# Patient Record
Sex: MALE | Race: WHITE | NOT HISPANIC OR LATINO | Employment: FULL TIME | URBAN - METROPOLITAN AREA
[De-identification: names, ages, dates, MRNs, and addresses within clinical notes are randomized per-mention and may not be internally consistent; named-entity substitution may affect disease eponyms.]

---

## 2017-06-23 ENCOUNTER — TELEPHONE (OUTPATIENT)
Dept: MEDICAL GROUP | Facility: MEDICAL CENTER | Age: 42
End: 2017-06-23

## 2017-06-23 NOTE — TELEPHONE ENCOUNTER
NEW PATIENT VISIT PRE-VISIT PLANNING    1.  [employer] Patient is checked in Patient Demographics? NO    2.  Immunizations were updated in [facility] using WebIZ?: Yes       •  Web Iz Recommendations: HEPATITIS A  TDAP    3.  Is this appointment scheduled as a Hospital Follow-Up? No    4.  Patient is due for the following Health Maintenance Topics:   Health Maintenance Due   Topic Date Due   • IMM DTaP/Tdap/Td Vaccine (1 - Tdap) 12/09/2005           5.  Reviewed/Updated the following with patient:       •   Preferred Pharmacy? NO       •   Preferred Lab? NO       •   Medications? NO       •   Social History? NO       •   Family History? NO    6.  Updated Care Team?       •   DME Company (gait device, O2, CPAP, etc.) N\A       •   Other Specialists (eye doctor, derm, GYN, cardiology, endo, etc): N\A    7.  Patient was informed to arrive 15 min prior to their scheduled appointment and bring in their medication bottles.    Left vm for patient reminding him of appt.

## 2017-06-26 ENCOUNTER — HOSPITAL ENCOUNTER (OUTPATIENT)
Facility: MEDICAL CENTER | Age: 42
End: 2017-06-26
Attending: INTERNAL MEDICINE
Payer: COMMERCIAL

## 2017-06-26 ENCOUNTER — OFFICE VISIT (OUTPATIENT)
Dept: MEDICAL GROUP | Facility: MEDICAL CENTER | Age: 42
End: 2017-06-26
Payer: COMMERCIAL

## 2017-06-26 VITALS
TEMPERATURE: 97.9 F | SYSTOLIC BLOOD PRESSURE: 124 MMHG | HEIGHT: 72 IN | OXYGEN SATURATION: 96 % | HEART RATE: 57 BPM | BODY MASS INDEX: 36.08 KG/M2 | WEIGHT: 266.4 LBS | DIASTOLIC BLOOD PRESSURE: 72 MMHG

## 2017-06-26 DIAGNOSIS — Z01.89 PATIENT REQUESTED DIAGNOSTIC TESTING: ICD-10-CM

## 2017-06-26 DIAGNOSIS — E66.9 OBESITY (BMI 30-39.9): ICD-10-CM

## 2017-06-26 DIAGNOSIS — Z00.00 HEALTH CARE MAINTENANCE: ICD-10-CM

## 2017-06-26 DIAGNOSIS — Z11.4 SCREENING FOR HIV (HUMAN IMMUNODEFICIENCY VIRUS): ICD-10-CM

## 2017-06-26 DIAGNOSIS — Z76.89 ENCOUNTER TO ESTABLISH CARE: ICD-10-CM

## 2017-06-26 DIAGNOSIS — R73.01 IFG (IMPAIRED FASTING GLUCOSE): ICD-10-CM

## 2017-06-26 DIAGNOSIS — M10.9 GOUT OF BIG TOE: ICD-10-CM

## 2017-06-26 PROCEDURE — 87491 CHLMYD TRACH DNA AMP PROBE: CPT

## 2017-06-26 PROCEDURE — 87591 N.GONORRHOEAE DNA AMP PROB: CPT

## 2017-06-26 PROCEDURE — 99214 OFFICE O/P EST MOD 30 MIN: CPT | Performed by: INTERNAL MEDICINE

## 2017-06-26 ASSESSMENT — PATIENT HEALTH QUESTIONNAIRE - PHQ9: CLINICAL INTERPRETATION OF PHQ2 SCORE: 0

## 2017-06-26 NOTE — MR AVS SNAPSHOT
"        Ross Ryder   2017 9:20 AM   Office Visit   MRN: [id number]    Department:  [place] [address]   Dept Phone:  [phone]    Description:  Male : 1975   Provider:  Lukasz Borrego M.D.           Reason for Visit     Establish Care           Allergies as of 2017     Allergen Noted Reactions    Penicillin G 2016         You were diagnosed with     IFG (impaired fasting glucose)   [144777]       Obesity (BMI 30-39.9)   [360591]       Gout of big toe   [809576]       Encounter to establish care   [211694]       Health care maintenance   [135142]       Screening for HIV (human immunodeficiency virus)   [259949]       Patient requested diagnostic testing   [637053]         Vital Signs     Blood Pressure Pulse Temperature Height Weight Body Mass Index    124/72 mmHg 57 36.6 °C (97.9 °F) 1.829 m (6' 0.01\") 120.838 kg (266 lb 6.4 oz) 36.12 kg/m2    Oxygen Saturation Smoking Status                96% Never Smoker           Basic Information     Date Of Birth Sex Race Ethnicity Preferred Language    1975 Male White Non- English      Your appointments     2017  9:20 AM   Access New Patient with Lukasz Borrego M.D.   [facility]    [id number]  [place] [id number] [phone]           Please bring Photo ID, Insurance Cards, All Medication Bottles and copies of any legal documents (such as Living Will, Power of ) If speaking a language besides English please bring an adult . Please arrive 30 minutes prior for check in and registration. You will be receiving a confirmation call a few days before your appointment from our automated call confirmation system.            2018  9:00 AM   Established Patient with Lukasz Borrego M.D.   [facility])    [address]  [address]  [place] [address] [phone]           You " will be receiving a confirmation call a few days before your appointment from our automated call confirmation system.              Problem List              ICD-10-CM Priority Class Noted - Resolved    Obesity (BMI 30-39.9) E66.9   6/26/2017 - Present    Health care maintenance Z00.00   6/26/2017 - Present      Health Maintenance        Date Due Completion Dates    IMM DTaP/Tdap/Td Vaccine (1 - Tdap) 12/9/2005 12/8/2005            Current Immunizations     Influenza Vaccine Quad Inj (Pf) 11/22/2016    Influenza Vaccine Quad Inj (Preserved) 12/22/2015    TD Vaccine 12/8/2005      Below and/or attached are the medications your provider expects you to take. Review all of your home medications and newly ordered medications with your provider and/or pharmacist. Follow medication instructions as directed by your provider and/or pharmacist. Please keep your medication list with you and share with your provider. Update the information when medications are discontinued, doses are changed, or new medications (including over-the-counter products) are added; and carry medication information at all times in the event of emergency situations     Allergies:  PENICILLIN G - (reactions not documented)               Medications  Valid as of: June 26, 2017 -  9:13 AM    Generic Name Brand Name Tablet Size Instructions for use    Ibuprofen   Take  by mouth.        Sulfamethoxazole-Trimethoprim (Tab) BACTRIM -160 MG Take 1 Tab by mouth 2 times a day.        Triamcinolone Acetonide (Cream) KENALOG 0.1 % Apply 1 Film to affected area(s) 2 times a day.        Triamcinolone Acetonide (Cream) KENALOG 0.1 % Apply to rash two times daily until resolved.        .                 Medicines prescribed today were sent to:     [id number] - [place] - [address]    [address] [place] [address]    Phone: [phone] Fax: [phone]    Open 24 Hours?: No      Medication refill instructions:       If your prescription  bottle indicates you have medication refills left, it is not necessary to call your provider’s office. Please contact your pharmacy and they will refill your medication.    If your prescription bottle indicates you do not have any refills left, you may request refills at any time through one of the following ways: The online [employer] system (except Urgent Care), by calling your provider’s office, or by asking your pharmacy to contact your provider’s office with a refill request. Medication refills are processed only during regular business hours and may not be available until the next business day. Your provider may request additional information or to have a follow-up visit with you prior to refilling your medication.   *Please Note: Medication refills are assigned a new Rx number when refilled electronically. Your pharmacy may indicate that no refills were authorized even though a new prescription for the same medication is available at the pharmacy. Please request the medicine by name with the pharmacy before contacting your provider for a refill.        Your To Do List     Future Labs/Procedures Complete By Expires    CBC WITH DIFFERENTIAL  As directed 6/27/2018    CHLAMYDIA/GC PCR URINE OR SWAB  As directed 6/26/2018    COMP METABOLIC PANEL  As directed 6/27/2018    HEMOGLOBIN A1C  As directed 6/27/2018    HIV ANTIBODIES  As directed 6/26/2018    LIPID PROFILE  As directed 6/27/2018    TSH  As directed 6/27/2018    URIC ACID  As directed 6/27/2018    VITAMIN D,25 HYDROXY  As directed 6/27/2018         [employer] Access Code: Activation code not generated  Current [employer] Status: Active

## 2017-06-26 NOTE — PROGRESS NOTES
CHIEF COMPLAINT  Chief Complaint   Patient presents with   • [facility]   Gout    HPI  Patient is a 42 y.o. male patient who presents today for the following     IFG  The patient had elevated FBG.  No polydipsia, polyphagia, polyuria.  No abdominal pain, weight loss, fatigue.  Diet: Regular  Exercise: Decreased in the last 3-6 months  FH of DM: Father, P-GF    OBESITY, Body mass index is 36.12 kg/(m^2).  Onset: since the age of mid 30'  Diet and exercise as above  No temperature intolerance. No change in hair/skin quality, BMs.   No HTN, buffalo hump, purple striae, flushing.  FH of obesity: mother, M-aunt    Gout, R great toe  First episode, new problem.  He had transient episode of the right first MTP pain and redness for a few days 1 month ago.   He noticed that it was after big supper.  He did not have fever, chills, trauma.  Resolved spontaneously.      Reviewed PMH, PSH, FH, SH, ALL, HCM/IMM  Reviewed MEDS    Patient Active Problem List    Diagnosis Date Noted   • Obesity (BMI 30-39.9) 06/26/2017   • Health care maintenance 06/26/2017     CURRENT MEDICATIONS  Current Outpatient Prescriptions   Medication Sig Dispense Refill   • sulfamethoxazole-trimethoprim (BACTRIM DS) 800-160 MG tablet Take 1 Tab by mouth 2 times a day. 20 Tab 0   • triamcinolone acetonide (KENALOG) 0.1 % Cream Apply to rash two times daily until resolved. 80 g 3   • triamcinolone acetonide (KENALOG) 0.1 % Cream Apply 1 Film to affected area(s) 2 times a day. 1 Tube 0   • Ibuprofen (MOTRIN PO) Take  by mouth.       No current facility-administered medications for this visit.     ALLERGIES  Allergies: Penicillin g  PAST MEDICAL HISTORY  Past Medical History   Diagnosis Date   • Obesity    • IFG (impaired fasting glucose)      SURGICAL HISTORY  He  has no past surgical history on file.  SOCIAL HISTORY  Social History   Substance Use Topics   • Smoking status: Never Smoker    • Smokeless tobacco: Never Used   • Alcohol Use: No     Social  "History     Social History Narrative     FAMILY HISTORY  Family History   Problem Relation Age of Onset   • Cancer Maternal Aunt      anal   • Diabetes Father    • Diabetes Paternal Grandfather    • Heart Disease Neg Hx    • Hypertension Neg Hx    • Hyperlipidemia Neg Hx    • Psychiatry Neg Hx    • Stroke Neg Hx    • Thyroid Neg Hx      No family status information on file.     ROS   Constitutional: Negative for fever, chills.  HENT: Negative for congestion, sore throat.  Eyes: Negative for blurred vision.   Respiratory: Negative for cough, shortness of breath.  Cardiovascular: Negative for chest pain, palpitations.   Gastrointestinal: Negative for heartburn, nausea, abdominal pain.   Genitourinary: Negative for dysuria.  Musculoskeletal: As above.  Skin: Negative for rash and itching.   Neuro: Negative for dizziness, weakness and headaches.   Endo/Heme/Allergies: Does not bruise/bleed easily. And per HPI.  Psychiatric/Behavioral: Negative for depression, anxiety    PHYSICAL EXAM   Blood pressure 124/72, pulse 57, temperature 36.6 °C (97.9 °F), height 1.829 m (6' 0.01\"), weight 120.838 kg (266 lb 6.4 oz), SpO2 96 %. Body mass index is 36.12 kg/(m^2).  General:  NAD, well appearing  HEENT:   NC/AT, PERRLA, EOMI, TMs are clear. Oropharyngeal mucosa is pink,  without lesions;  no cervical / supraclavicular  lymphadenopathy, no thyromegaly.    Cardiovascular: RRR.   No m/r/g. No carotid bruits .      Lungs:   CTAB, no w/r/r, no respiratory distress.  Abdomen: Soft, NT/ND + BS; no suprapubic tenderness; no masses or hepatosplenomegaly.  Extremities:  2+ DP and radial pulses bilaterally.  No c/c/e.   Skin:  Warm, dry.  No erythema. No rash.   Neurologic: Alert & oriented x 3. No focal deficits.  Psychiatric:  Affect normal, mood normal, judgment normal.    LABS     Labs are reviewed and discussed with a patient    Lab Results   Component Value Date/Time    CHOLESTEROL, 09/12/2016 10:40 AM    LDL 99 09/12/2016 10:40 " AM    HDL 39* 09/12/2016 10:40 AM    TRIGLYCERIDES 96 09/12/2016 10:40 AM       Lab Results   Component Value Date/Time    GLUCOSE 126* 09/12/2016 10:40 AM       IMAGING     None    ASSESMENT AND PLAN        1. IFG (impaired fasting glucose)  Discussed about risk to develop DM.   Advised low carb diet, exercise, watch for WT.   - COMP METABOLIC PANEL; Future  - HEMOGLOBIN A1C; Future    2. Obesity (BMI 30-39.9)  - Patient identified as having weight management issue.  Appropriate orders and counseling given.  - LIPID PROFILE; Future  - TSH; Future    3. Gout of big toe  Advised to increase fluid intake, avoid food that can cause gout  - URIC ACID; Future    4. Encounter to establish care  Reviewed PMH, PSH, FH, SH, ALL, MEDS, HCM/IMM.   Advised healthy habits, diet, exercise.    5. Health care maintenance  Pending: Records  - CBC WITH DIFFERENTIAL; Future  - VITAMIN D,25 HYDROXY; Future    6. Screening for HIV (human immunodeficiency virus)  - HIV ANTIBODIES; Future    7. Patient requested diagnostic testing  - CHLAMYDIA/GC PCR URINE OR SWAB; Future    Counseling:   - Smoking:  Nonsmoker    Followup: Return in about 6 months (around 12/26/2017) for Short.    All questions are answered.    Please note that this dictation was created using voice recognition software, and that there might be errors of bryan and possibly content.

## 2017-06-27 LAB
C TRACH DNA SPEC QL NAA+PROBE: NEGATIVE
N GONORRHOEA DNA SPEC QL NAA+PROBE: NEGATIVE
SPECIMEN SOURCE: NORMAL

## 2017-08-22 ENCOUNTER — HOSPITAL ENCOUNTER (OUTPATIENT)
Dept: LAB | Facility: MEDICAL CENTER | Age: 42
End: 2017-08-22
Attending: INTERNAL MEDICINE
Payer: COMMERCIAL

## 2017-08-22 ENCOUNTER — HOSPITAL ENCOUNTER (OUTPATIENT)
Dept: LAB | Facility: MEDICAL CENTER | Age: 42
End: 2017-08-22
Payer: COMMERCIAL

## 2017-08-22 DIAGNOSIS — M10.9 GOUT OF BIG TOE: ICD-10-CM

## 2017-08-22 DIAGNOSIS — Z00.00 HEALTH CARE MAINTENANCE: ICD-10-CM

## 2017-08-22 DIAGNOSIS — R73.01 IFG (IMPAIRED FASTING GLUCOSE): ICD-10-CM

## 2017-08-22 DIAGNOSIS — E66.9 OBESITY (BMI 30-39.9): ICD-10-CM

## 2017-08-22 DIAGNOSIS — Z11.4 SCREENING FOR HIV (HUMAN IMMUNODEFICIENCY VIRUS): ICD-10-CM

## 2017-08-22 LAB
25(OH)D3 SERPL-MCNC: 23 NG/ML (ref 30–100)
ALBUMIN SERPL BCP-MCNC: 4.2 G/DL (ref 3.2–4.9)
ALBUMIN/GLOB SERPL: 1.4 G/DL
ALP SERPL-CCNC: 70 U/L (ref 30–99)
ALT SERPL-CCNC: 81 U/L (ref 2–50)
ANION GAP SERPL CALC-SCNC: 6 MMOL/L (ref 0–11.9)
AST SERPL-CCNC: 38 U/L (ref 12–45)
BASOPHILS # BLD AUTO: 0.6 % (ref 0–1.8)
BASOPHILS # BLD: 0.04 K/UL (ref 0–0.12)
BDY FAT % MEASURED: 32.8 %
BILIRUB SERPL-MCNC: 0.5 MG/DL (ref 0.1–1.5)
BP DIAS: 79 MMHG
BP SYS: 122 MMHG
BUN SERPL-MCNC: 11 MG/DL (ref 8–22)
CALCIUM SERPL-MCNC: 9.4 MG/DL (ref 8.5–10.5)
CHLORIDE SERPL-SCNC: 106 MMOL/L (ref 96–112)
CHOLEST SERPL-MCNC: 135 MG/DL (ref 100–199)
CHOLEST SERPL-MCNC: 139 MG/DL (ref 100–199)
CO2 SERPL-SCNC: 25 MMOL/L (ref 20–33)
CREAT SERPL-MCNC: 1.02 MG/DL (ref 0.5–1.4)
DIABETES HTDIA: NO
EOSINOPHIL # BLD AUTO: 0.18 K/UL (ref 0–0.51)
EOSINOPHIL NFR BLD: 2.6 % (ref 0–6.9)
ERYTHROCYTE [DISTWIDTH] IN BLOOD BY AUTOMATED COUNT: 41.8 FL (ref 35.9–50)
EST. AVERAGE GLUCOSE BLD GHB EST-MCNC: 123 MG/DL
EVENT NAME HTEVT: NORMAL
FASTING HTFAS: YES
GFR SERPL CREATININE-BSD FRML MDRD: >60 ML/MIN/1.73 M 2
GLOBULIN SER CALC-MCNC: 3.1 G/DL (ref 1.9–3.5)
GLUCOSE SERPL-MCNC: 113 MG/DL (ref 65–99)
GLUCOSE SERPL-MCNC: 117 MG/DL (ref 65–99)
HBA1C MFR BLD: 5.9 % (ref 0–5.6)
HCT VFR BLD AUTO: 51.9 % (ref 42–52)
HDLC SERPL-MCNC: 34 MG/DL
HDLC SERPL-MCNC: 34 MG/DL
HGB BLD-MCNC: 18.1 G/DL (ref 14–18)
HIV 1+2 AB+HIV1 P24 AG SERPL QL IA: NON REACTIVE
HYPERTENSION HTHYP: NO
IMM GRANULOCYTES # BLD AUTO: 0.03 K/UL (ref 0–0.11)
IMM GRANULOCYTES NFR BLD AUTO: 0.4 % (ref 0–0.9)
LDLC SERPL CALC-MCNC: 77 MG/DL
LDLC SERPL CALC-MCNC: 81 MG/DL
LYMPHOCYTES # BLD AUTO: 1.54 K/UL (ref 1–4.8)
LYMPHOCYTES NFR BLD: 22.5 % (ref 22–41)
MCH RBC QN AUTO: 29.9 PG (ref 27–33)
MCHC RBC AUTO-ENTMCNC: 34.9 G/DL (ref 33.7–35.3)
MCV RBC AUTO: 85.8 FL (ref 81.4–97.8)
MONOCYTES # BLD AUTO: 0.5 K/UL (ref 0–0.85)
MONOCYTES NFR BLD AUTO: 7.3 % (ref 0–13.4)
NEUTROPHILS # BLD AUTO: 4.56 K/UL (ref 1.82–7.42)
NEUTROPHILS NFR BLD: 66.6 % (ref 44–72)
NRBC # BLD AUTO: 0 K/UL
NRBC BLD AUTO-RTO: 0 /100 WBC
PLATELET # BLD AUTO: 181 K/UL (ref 164–446)
PMV BLD AUTO: 10.8 FL (ref 9–12.9)
POTASSIUM SERPL-SCNC: 4 MMOL/L (ref 3.6–5.5)
PROT SERPL-MCNC: 7.3 G/DL (ref 6–8.2)
RBC # BLD AUTO: 6.05 M/UL (ref 4.7–6.1)
SCREENING LOC CITY HTCIT: NORMAL
SCREENING LOC STATE HTSTA: NORMAL
SCREENING LOCATION HTLOC: NORMAL
SMOKING HTSMO: NO
SODIUM SERPL-SCNC: 137 MMOL/L (ref 135–145)
SUBSCRIBER ID HTSID: NORMAL
TRIGL SERPL-MCNC: 118 MG/DL (ref 0–149)
TRIGL SERPL-MCNC: 120 MG/DL (ref 0–149)
TSH SERPL DL<=0.005 MIU/L-ACNC: 2.53 UIU/ML (ref 0.3–3.7)
URATE SERPL-MCNC: 6.7 MG/DL (ref 2.5–8.3)
WBC # BLD AUTO: 6.9 K/UL (ref 4.8–10.8)

## 2017-08-22 PROCEDURE — 80061 LIPID PANEL: CPT | Mod: 91

## 2017-08-22 PROCEDURE — S5190 WELLNESS ASSESSMENT BY NONPH: HCPCS

## 2017-08-22 PROCEDURE — 87389 HIV-1 AG W/HIV-1&-2 AB AG IA: CPT

## 2017-08-22 PROCEDURE — 80061 LIPID PANEL: CPT

## 2017-08-22 PROCEDURE — 82306 VITAMIN D 25 HYDROXY: CPT

## 2017-08-22 PROCEDURE — 80053 COMPREHEN METABOLIC PANEL: CPT

## 2017-08-22 PROCEDURE — 84443 ASSAY THYROID STIM HORMONE: CPT

## 2017-08-22 PROCEDURE — 85025 COMPLETE CBC W/AUTO DIFF WBC: CPT

## 2017-08-22 PROCEDURE — 36415 COLL VENOUS BLD VENIPUNCTURE: CPT

## 2017-08-22 PROCEDURE — 82947 ASSAY GLUCOSE BLOOD QUANT: CPT

## 2017-08-22 PROCEDURE — 83036 HEMOGLOBIN GLYCOSYLATED A1C: CPT

## 2017-08-22 PROCEDURE — 84550 ASSAY OF BLOOD/URIC ACID: CPT

## 2017-08-23 DIAGNOSIS — R73.01 IFG (IMPAIRED FASTING GLUCOSE): ICD-10-CM

## 2017-08-23 DIAGNOSIS — E55.9 HYPOVITAMINOSIS D: ICD-10-CM

## 2017-09-13 ENCOUNTER — EH NON-PROVIDER (OUTPATIENT)
Dept: OCCUPATIONAL MEDICINE | Facility: CLINIC | Age: 42
End: 2017-09-13

## 2017-09-13 DIAGNOSIS — Z29.89 NEED FOR ISOLATION: ICD-10-CM

## 2017-09-13 PROCEDURE — 94375 RESPIRATORY FLOW VOLUME LOOP: CPT

## 2017-10-03 ENCOUNTER — IMMUNIZATION (OUTPATIENT)
Dept: OCCUPATIONAL MEDICINE | Facility: CLINIC | Age: 42
End: 2017-10-03

## 2017-10-03 DIAGNOSIS — Z23 NEED FOR VACCINATION: ICD-10-CM

## 2017-10-03 PROCEDURE — 90686 IIV4 VACC NO PRSV 0.5 ML IM: CPT | Performed by: PREVENTIVE MEDICINE

## 2017-10-09 ENCOUNTER — OFFICE VISIT (OUTPATIENT)
Dept: MEDICAL GROUP | Facility: MEDICAL CENTER | Age: 42
End: 2017-10-09
Payer: COMMERCIAL

## 2017-10-09 VITALS
SYSTOLIC BLOOD PRESSURE: 118 MMHG | HEIGHT: 72 IN | OXYGEN SATURATION: 96 % | RESPIRATION RATE: 18 BRPM | HEART RATE: 70 BPM | WEIGHT: 277 LBS | DIASTOLIC BLOOD PRESSURE: 80 MMHG | TEMPERATURE: 98.9 F | BODY MASS INDEX: 37.52 KG/M2

## 2017-10-09 DIAGNOSIS — E66.9 OBESITY (BMI 30-39.9): ICD-10-CM

## 2017-10-09 DIAGNOSIS — M25.561 CHRONIC PAIN OF RIGHT KNEE: ICD-10-CM

## 2017-10-09 DIAGNOSIS — Z00.00 HEALTH CARE MAINTENANCE: ICD-10-CM

## 2017-10-09 DIAGNOSIS — G89.29 CHRONIC PAIN OF RIGHT KNEE: ICD-10-CM

## 2017-10-09 PROCEDURE — 99213 OFFICE O/P EST LOW 20 MIN: CPT | Performed by: INTERNAL MEDICINE

## 2017-10-09 ASSESSMENT — PAIN SCALES - GENERAL: PAINLEVEL: 4=SLIGHT-MODERATE PAIN

## 2017-10-10 NOTE — PROGRESS NOTES
CHIEF COMPLAINT  Chief Complaint   Patient presents with   • Referral Needed     ortho for righ knee pain     HPI  Patient is a 42 y.o. male patient who presents today for the following     Right  Knee pain  42-year-old, male, with history of previous medial meniscus tear due to sports    Current episode  - Onset: in his 30, sport's injury   - meniscus tear, medial  This episode: 2 mos ago  - Triger:   - located in: distal, medial area of the R knee  - intensity:  8-9/10  - quality:  dull and sharp   - radiation:  no  - alleviating factors are:  rest, advil occasionaly  -  exacerbating factors are:  activity  - accompanied:    - with occasional clicking    - no numbness, weakness, tingling, fever, chills    - redness, swelling  - course: worsening  - therapy: as above     OBESITY, Body mass index is 37, was 36 kg/(m^2).  Onset: since the age of mid 30'  Diet and exercise as above  No temperature intolerance. No change in hair/skin quality, BMs.   No HTN, buffalo hump, purple striae, flushing.  FH of obesity: mother, M-aunt  Reviewed PMH, PSH, FH, SH, ALL, HCM/IMM, no changes  Reviewed MEDS, no changes    Patient Active Problem List    Diagnosis Date Noted   • IFG (impaired fasting glucose) 08/23/2017   • Obesity (BMI 30-39.9) 06/26/2017   • Health care maintenance 06/26/2017     CURRENT MEDICATIONS  Current Outpatient Prescriptions   Medication Sig Dispense Refill   • Ibuprofen (MOTRIN PO) Take  by mouth.     • sulfamethoxazole-trimethoprim (BACTRIM DS) 800-160 MG tablet Take 1 Tab by mouth 2 times a day. 20 Tab 0   • triamcinolone acetonide (KENALOG) 0.1 % Cream Apply to rash two times daily until resolved. 80 g 3   • triamcinolone acetonide (KENALOG) 0.1 % Cream Apply 1 Film to affected area(s) 2 times a day. 1 Tube 0     No current facility-administered medications for this visit.      ALLERGIES  Allergies: Penicillin g    PAST MEDICAL HISTORY  Past Medical History:   Diagnosis Date   • IFG (impaired fasting  "glucose)    • Obesity      SURGICAL HISTORY  He  has no past surgical history on file.    SOCIAL HISTORY  Social History   Substance Use Topics   • Smoking status: Never Smoker   • Smokeless tobacco: Never Used   • Alcohol use Not on file     Social History     Social History Narrative   • No narrative on file     FAMILY HISTORY  Family History   Problem Relation Age of Onset   • Cancer Maternal Aunt      anal   • Diabetes Father    • Diabetes Paternal Grandfather    • Heart Disease Neg Hx    • Hypertension Neg Hx    • Hyperlipidemia Neg Hx    • Psychiatry Neg Hx    • Stroke Neg Hx    • Thyroid Neg Hx      Family Status   Relation Status   • Maternal Aunt    • Father    • Paternal Grandfather    • Neg Hx      ROS   Constitutional: Negative for fever, chills.  HENT: Negative for congestion, sore throat.  Eyes: Negative for blurred vision.   Respiratory: Negative for cough, shortness of breath.  Cardiovascular: Negative for chest pain, palpitations.   Gastrointestinal: Negative for heartburn, nausea, abdominal pain.   Musculoskeletal: as above.  Skin: Negative for rash and itching.   Neuro: Negative for dizziness, weakness and headaches.   Endo/Heme/Allergies: Does not bruise/bleed easily.   Psychiatric/Behavioral: Negative for depression, anxiety    PHYSICAL EXAM   Blood pressure 118/80, pulse 70, temperature 37.2 °C (98.9 °F), resp. rate 18, height 1.829 m (6' 0.01\"), weight (!) 125.6 kg (277 lb), SpO2 96 %. Body mass index is 37.56 kg/m².  General:  NAD, well appearing  HEENT:   NC/AT, PERRLA, EOMI, TMs are clear. Oropharyngeal mucosa is pink,  without lesions;  no cervical / supraclavicular  lymphadenopathy, no thyromegaly.    Cardiovascular: RRR.   No m/r/g. No carotid bruits .      Lungs:   CTAB, no w/r/r, no respiratory distress.  Abdomen: Soft, NT/ND + BS; no suprapubic tenderness; no masses or hepatosplenomegaly.  Extremities:  2+ DP and radial pulses bilaterally.   Right knee: no effusion, incisions, skin " lesions. Full unrestricted symmetric ROM. Alignment is normal. No flexion contracture or extension lag.   Ligaments: normal MCL, LCL, Negative Lachman's, anterior drawer.   No medial or lateral joint space tenderness. Yousuf's test pos for med meniscus. Negative patellar apprehension and negative patellar grind. No tenderness about the knee. No popliteal masses palpated.   Strength 5/5 Quadriceps, hamstrings, extensor hallius longus, tibialis anterior, gastroc.   Gait normal heel to toe. Able to bear full weight. Distal pulses normal.   Skin:  Warm, dry.  No erythema. No rash.   Neurologic: Alert & oriented x 3.  No focal deficits.  Psychiatric:  Affect normal, mood normal, judgment normal.    LABS     None    IMAGING     None    ASSESMENT AND PLAN        1. Chronic pain of right knee  Continue activity as tolerated.   Brace may help.     - MR-KNEE-W/O RIGHT; Future  - REFERRAL TO ORTHOPEDICS    2. Obesity (BMI 30-39.9)  - OBESITY COUNSELING (No Charge): Patient identified as having weight management issue.  Appropriate orders and counseling given.    3. Health care maintenance  UTD    Counseling:   - Smoking:  Nonsmoker    Followup: Return if symptoms worsen or fail to improve, for Short.    All questions are answered.    Please note that this dictation was created using voice recognition software, and that there might be errors of bryan and possibly content.

## 2017-10-13 ENCOUNTER — HOSPITAL ENCOUNTER (OUTPATIENT)
Dept: RADIOLOGY | Facility: MEDICAL CENTER | Age: 42
End: 2017-10-13
Attending: INTERNAL MEDICINE
Payer: COMMERCIAL

## 2017-10-13 DIAGNOSIS — G89.29 CHRONIC PAIN OF RIGHT KNEE: ICD-10-CM

## 2017-10-13 DIAGNOSIS — M25.561 CHRONIC PAIN OF RIGHT KNEE: ICD-10-CM

## 2017-10-13 PROCEDURE — 73721 MRI JNT OF LWR EXTRE W/O DYE: CPT | Mod: RT

## 2018-01-09 ENCOUNTER — APPOINTMENT (OUTPATIENT)
Dept: MEDICAL GROUP | Facility: MEDICAL CENTER | Age: 43
End: 2018-01-09
Payer: COMMERCIAL

## 2018-04-03 ENCOUNTER — OFFICE VISIT (OUTPATIENT)
Dept: URGENT CARE | Facility: CLINIC | Age: 43
End: 2018-04-03
Payer: COMMERCIAL

## 2018-04-03 VITALS
BODY MASS INDEX: 38.33 KG/M2 | OXYGEN SATURATION: 98 % | HEIGHT: 72 IN | TEMPERATURE: 97.9 F | DIASTOLIC BLOOD PRESSURE: 88 MMHG | SYSTOLIC BLOOD PRESSURE: 144 MMHG | RESPIRATION RATE: 14 BRPM | HEART RATE: 95 BPM | WEIGHT: 283 LBS

## 2018-04-03 DIAGNOSIS — L08.9 SOFT TISSUE INFECTION: ICD-10-CM

## 2018-04-03 PROCEDURE — 99213 OFFICE O/P EST LOW 20 MIN: CPT | Performed by: NURSE PRACTITIONER

## 2018-04-08 NOTE — PROGRESS NOTES
Subjective:      Ross Ryder is a 43 y.o. male who presents with Rash            This is a new problem. Pt reports onset of small rash above right eyebrow that developed 3 days ago. There are 3 individual red bumps. He admits two of the bumps are getting larger and are more red today. He denies any tenderness or drainage from the bumps. He is concerned because they are getting larger. He also notes a tender area in front of his right ear and is concerned this may be another bump developing. He is not sure what he may have come in contact with but doesn't think it is an allergic reaction. He has been applying cortisone cream with little relief.        Review of Systems   Skin:        Localized soft tissue bumps above right eyebrow   All other systems reviewed and are negative.    Past Medical History:   Diagnosis Date   • IFG (impaired fasting glucose)    • Obesity     History reviewed. No pertinent surgical history.   Social History     Social History   • Marital status: Single     Spouse name: N/A   • Number of children: N/A   • Years of education: N/A     Occupational History   • Not on file.     Social History Main Topics   • Smoking status: Never Smoker   • Smokeless tobacco: Never Used   • Alcohol use Not on file   • Drug use: Unknown   • Sexual activity: Not on file     Other Topics Concern   • Not on file     Social History Narrative   • No narrative on file          Objective:     /88   Pulse 95   Temp 36.6 °C (97.9 °F)   Resp 14   Ht 1.829 m (6')   Wt (!) 128.4 kg (283 lb)   SpO2 98%   BMI 38.38 kg/m²      Physical Exam   Constitutional: He is oriented to person, place, and time. Vital signs are normal. He appears well-developed and well-nourished.   HENT:   Head: Normocephalic and atraumatic.   Right Ear: External ear normal.   Left Ear: External ear normal.   Nose: Nose normal.   Eyes: EOM are normal. Pupils are equal, round, and reactive to light.       Neck: Normal range of motion.      Cardiovascular: Normal rate and regular rhythm.    Pulmonary/Chest: Effort normal.   Musculoskeletal: Normal range of motion.   Lymphadenopathy:        Head (right side): Preauricular adenopathy present.   Neurological: He is alert and oriented to person, place, and time.   Skin: Skin is warm and dry. Capillary refill takes less than 2 seconds.   Psychiatric: He has a normal mood and affect. His behavior is normal. Thought content normal.   Vitals reviewed.              Assessment/Plan:     1. Soft tissue infection  - mupirocin (BACTROBAN) 2 % Ointment; Apply 1 Application to affected area(s) 2 times a day for 7 days.  Dispense: 1 Tube; Refill: 0    Discussed with patient this does appear to be a local soft tissue infection strep vs. Staph in nature. Local reactionary lymph node in preauricular region. Given the areas are nontender, I do not think he requires systemic tx with ABX, he agrees.   No concern for shingles at this time  Apply bactroban as directed. RTC if no improvement after 4 days  All questions answered  Supportive care, differential diagnoses, and indications for immediate follow-up discussed with patient.    Pathogenesis of diagnosis discussed including typical length and natural progression.      Instructed to return to  or nearest emergency department if symptoms fail to improve, for any change in condition, further concerns, or new concerning symptoms.  Patient states understanding of the plan of care and discharge instructions.

## 2018-07-23 ENCOUNTER — HOSPITAL ENCOUNTER (OUTPATIENT)
Dept: LAB | Facility: MEDICAL CENTER | Age: 43
End: 2018-07-23
Payer: COMMERCIAL

## 2018-07-23 LAB
BDY FAT % MEASURED: 34.4 %
BP DIAS: 86 MMHG
BP SYS: 127 MMHG
CHOLEST SERPL-MCNC: 182 MG/DL (ref 100–199)
DIABETES HTDIA: NO
EVENT NAME HTEVT: NORMAL
FASTING HTFAS: YES
GLUCOSE SERPL-MCNC: 198 MG/DL (ref 65–99)
HDLC SERPL-MCNC: 40 MG/DL
HYPERTENSION HTHYP: NO
LDLC SERPL CALC-MCNC: 99 MG/DL
SCREENING LOC CITY HTCIT: NORMAL
SCREENING LOC STATE HTSTA: NORMAL
SCREENING LOCATION HTLOC: NORMAL
SMOKING HTSMO: NO
SUBSCRIBER ID HTSID: NORMAL
TRIGL SERPL-MCNC: 216 MG/DL (ref 0–149)

## 2018-07-23 PROCEDURE — 36415 COLL VENOUS BLD VENIPUNCTURE: CPT

## 2018-07-23 PROCEDURE — S5190 WELLNESS ASSESSMENT BY NONPH: HCPCS

## 2018-07-23 PROCEDURE — 82947 ASSAY GLUCOSE BLOOD QUANT: CPT

## 2018-07-23 PROCEDURE — 80061 LIPID PANEL: CPT

## 2018-08-07 ENCOUNTER — DOCUMENTATION (OUTPATIENT)
Dept: OCCUPATIONAL MEDICINE | Facility: CLINIC | Age: 43
End: 2018-08-07

## 2018-09-10 ENCOUNTER — EH NON-PROVIDER (OUTPATIENT)
Dept: OCCUPATIONAL MEDICINE | Facility: CLINIC | Age: 43
End: 2018-09-10

## 2018-09-10 DIAGNOSIS — Z02.89 ENCOUNTER FOR OCCUPATIONAL HEALTH EXAMINATION INVOLVING RESPIRATOR: Primary | ICD-10-CM

## 2018-09-10 PROCEDURE — 94375 RESPIRATORY FLOW VOLUME LOOP: CPT | Performed by: PREVENTIVE MEDICINE

## 2018-09-19 ENCOUNTER — IMMUNIZATION (OUTPATIENT)
Dept: OCCUPATIONAL MEDICINE | Facility: CLINIC | Age: 43
End: 2018-09-19

## 2018-09-19 DIAGNOSIS — Z23 NEED FOR VACCINATION: ICD-10-CM

## 2018-09-19 PROCEDURE — 90686 IIV4 VACC NO PRSV 0.5 ML IM: CPT | Performed by: PREVENTIVE MEDICINE

## 2018-12-26 ENCOUNTER — OFFICE VISIT (OUTPATIENT)
Dept: URGENT CARE | Facility: CLINIC | Age: 43
End: 2018-12-26
Payer: COMMERCIAL

## 2018-12-26 VITALS
RESPIRATION RATE: 16 BRPM | OXYGEN SATURATION: 96 % | HEART RATE: 78 BPM | DIASTOLIC BLOOD PRESSURE: 84 MMHG | BODY MASS INDEX: 38.33 KG/M2 | SYSTOLIC BLOOD PRESSURE: 132 MMHG | HEIGHT: 72 IN | TEMPERATURE: 98.1 F | WEIGHT: 283 LBS

## 2018-12-26 DIAGNOSIS — R50.9 FEVER, UNSPECIFIED FEVER CAUSE: ICD-10-CM

## 2018-12-26 DIAGNOSIS — J06.9 URI, ACUTE: Primary | ICD-10-CM

## 2018-12-26 DIAGNOSIS — R05.9 COUGH: ICD-10-CM

## 2018-12-26 LAB
FLUAV+FLUBV AG SPEC QL IA: NORMAL
INT CON NEG: NORMAL
INT CON POS: NORMAL

## 2018-12-26 PROCEDURE — 99214 OFFICE O/P EST MOD 30 MIN: CPT | Performed by: PHYSICIAN ASSISTANT

## 2018-12-26 PROCEDURE — 87804 INFLUENZA ASSAY W/OPTIC: CPT | Performed by: PHYSICIAN ASSISTANT

## 2018-12-27 NOTE — PROGRESS NOTES
Subjective:      Ross Ryder is a 43 y.o. male who presents with Nasal Congestion (stuffy noise, it started about a day and half ago); Fever (highest 101); and Otalgia (stuffy ear)    PMH:  has a past medical history of IFG (impaired fasting glucose) and Obesity.  MEDS:   Current Outpatient Prescriptions:   •  Ibuprofen (MOTRIN PO), Take  by mouth., Disp: , Rfl:   •  sulfamethoxazole-trimethoprim (BACTRIM DS) 800-160 MG tablet, Take 1 Tab by mouth 2 times a day. (Patient not taking: Reported on 12/26/2018), Disp: 20 Tab, Rfl: 0  •  triamcinolone acetonide (KENALOG) 0.1 % Cream, Apply to rash two times daily until resolved. (Patient not taking: Reported on 12/26/2018), Disp: 80 g, Rfl: 3  •  triamcinolone acetonide (KENALOG) 0.1 % Cream, Apply 1 Film to affected area(s) 2 times a day. (Patient not taking: Reported on 12/26/2018), Disp: 1 Tube, Rfl: 0  ALLERGIES:   Allergies   Allergen Reactions   • Penicillin G      SURGHX: History reviewed. No pertinent surgical history.  SOCHX:  reports that he has never smoked. He has never used smokeless tobacco.  FH: Reviewed with patient, not pertinent to this visit.           Patient presents with:  Nasal Congestion: stuffy noise, it started about a day and half ago  Fever: highest 101  Otalgia: stuffy ear        URI    This is a new problem. Episode onset: 2 days. The problem has been unchanged. The maximum temperature recorded prior to his arrival was 101 - 101.9 F. The fever has been present for less than 1 day. Associated symptoms include congestion, coughing, ear pain, a plugged ear sensation and rhinorrhea. Pertinent negatives include no chest pain, sinus pain, sore throat or wheezing. He has tried acetaminophen, NSAIDs, steam and decongestant for the symptoms. The treatment provided moderate relief.       Review of Systems   Constitutional: Positive for fever. Negative for chills and malaise/fatigue.   HENT: Positive for congestion, ear pain and rhinorrhea.  Negative for sinus pain and sore throat.    Respiratory: Positive for cough and sputum production. Negative for wheezing and stridor.    Cardiovascular: Negative for chest pain and leg swelling.   All other systems reviewed and are negative.         Objective:     /84   Pulse 78   Temp 36.7 °C (98.1 °F)   Resp 16   Ht 1.829 m (6')   Wt (!) 128.4 kg (283 lb)   SpO2 96%   BMI 38.38 kg/m²      Physical Exam   Constitutional: He is oriented to person, place, and time. Vital signs are normal. He appears well-developed and well-nourished. He is cooperative. He does not appear ill. No distress.   HENT:   Head: Normocephalic and atraumatic.   Right Ear: Tympanic membrane normal.   Left Ear: Tympanic membrane normal.   Nose: Mucosal edema and rhinorrhea present.   Mouth/Throat: Uvula is midline and mucous membranes are normal. Posterior oropharyngeal erythema present. No posterior oropharyngeal edema.   Eyes: Pupils are equal, round, and reactive to light. Conjunctivae, EOM and lids are normal.   Neck: Normal range of motion. Neck supple.   Cardiovascular: Normal rate, regular rhythm and normal heart sounds.    Pulmonary/Chest: Effort normal and breath sounds normal. He has no decreased breath sounds. He has no wheezes.   Abdominal: Soft.   Musculoskeletal: Normal range of motion.   Lymphadenopathy:     He has no cervical adenopathy.   Neurological: He is alert and oriented to person, place, and time. Gait normal.   Skin: Skin is warm and dry. Capillary refill takes less than 2 seconds.   Psychiatric: He has a normal mood and affect.   Nursing note and vitals reviewed.         Flu: neg  Assessment/Plan:     1. URI, acute  POCT Influenza A/B   2. Cough  POCT Influenza A/B   3. Fever, unspecified fever cause  POCT Influenza A/B     Discussed that I felt this was viral in nature. Did not see any evidence of a bacterial process. Discussed natural progression and sx care.    PT can continue OTC medications, increase  fluids and rest until symptoms improve.     PT should follow up with PCP in 1-2 days for re-evaluation if symptoms have not improved.  Discussed red flags and reasons to return to UC or ED.  Pt and/or family verbalized understanding of diagnosis and follow up instructions and was offered informational handout on diagnosis.  PT discharged.

## 2018-12-29 ASSESSMENT — ENCOUNTER SYMPTOMS
FEVER: 1
SPUTUM PRODUCTION: 1
SINUS PAIN: 0
CHILLS: 0
RHINORRHEA: 1
STRIDOR: 0
WHEEZING: 0
SORE THROAT: 0
COUGH: 1

## 2019-03-04 ENCOUNTER — APPOINTMENT (OUTPATIENT)
Dept: RADIOLOGY | Facility: IMAGING CENTER | Age: 44
End: 2019-03-04
Attending: PHYSICIAN ASSISTANT
Payer: COMMERCIAL

## 2019-03-04 ENCOUNTER — OFFICE VISIT (OUTPATIENT)
Dept: URGENT CARE | Facility: CLINIC | Age: 44
End: 2019-03-04
Payer: COMMERCIAL

## 2019-03-04 VITALS
RESPIRATION RATE: 16 BRPM | TEMPERATURE: 98.5 F | HEIGHT: 72 IN | WEIGHT: 283 LBS | HEART RATE: 70 BPM | BODY MASS INDEX: 38.33 KG/M2 | DIASTOLIC BLOOD PRESSURE: 78 MMHG | OXYGEN SATURATION: 96 % | SYSTOLIC BLOOD PRESSURE: 132 MMHG

## 2019-03-04 DIAGNOSIS — M79.671 RIGHT FOOT PAIN: ICD-10-CM

## 2019-03-04 DIAGNOSIS — M79.671 RIGHT FOOT PAIN: Primary | ICD-10-CM

## 2019-03-04 PROCEDURE — 99214 OFFICE O/P EST MOD 30 MIN: CPT | Performed by: PHYSICIAN ASSISTANT

## 2019-03-04 PROCEDURE — 73630 X-RAY EXAM OF FOOT: CPT | Mod: TC,FY,RT | Performed by: PHYSICIAN ASSISTANT

## 2019-03-04 RX ORDER — IBUPROFEN 800 MG/1
800 TABLET ORAL EVERY 8 HOURS PRN
Qty: 30 TAB | Refills: 0 | Status: SHIPPED | OUTPATIENT
Start: 2019-03-04 | End: 2019-03-09

## 2019-03-04 ASSESSMENT — ENCOUNTER SYMPTOMS
VOMITING: 0
CHILLS: 0
DIARRHEA: 0
COUGH: 0
JOINT SWELLING: 1
FEVER: 0
CONSTIPATION: 0
NAUSEA: 0
SHORTNESS OF BREATH: 0
WEAKNESS: 0
ABDOMINAL PAIN: 0
NUMBNESS: 0

## 2019-03-04 NOTE — PROGRESS NOTES
Subjective:   Ross Ryder is a 44 y.o. male who presents for Foot Pain (X3 days (R) foot pain and swellng)        Foot Problem   This is a new problem. Episode onset: 3 days. The problem occurs constantly. The problem has been unchanged. Associated symptoms include joint swelling. Pertinent negatives include no abdominal pain, chills, congestion, coughing, fever, nausea, numbness, rash, vomiting or weakness. Treatments tried: ibuprofen 800 mg        Pt does not recall injury to area. Pain is worse with prolong standing and weight bearing. He denies pervious injury or surgery. No previous hx of gout. Possible gout attack of the 1st R metatarsal joint few months ago. Denies poor diet/shellfish etc prior to pain starting.     Review of Systems   Constitutional: Negative for chills, fever and malaise/fatigue.   HENT: Negative for congestion.    Respiratory: Negative for cough and shortness of breath.    Gastrointestinal: Negative for abdominal pain, constipation, diarrhea, nausea and vomiting.   Musculoskeletal: Positive for joint pain and joint swelling.   Skin: Negative for rash.   Neurological: Negative for weakness and numbness.   All other systems reviewed and are negative.      PMH:  has a past medical history of IFG (impaired fasting glucose) and Obesity.  MEDS:   Current Outpatient Prescriptions:   •  ibuprofen (MOTRIN) 800 MG Tab, Take 1 Tab by mouth every 8 hours as needed for up to 5 days., Disp: 30 Tab, Rfl: 0  •  Ibuprofen (MOTRIN PO), Take  by mouth., Disp: , Rfl:   ALLERGIES:   Allergies   Allergen Reactions   • Penicillin G      SURGHX: History reviewed. No pertinent surgical history.  SOCHX:  reports that he has never smoked. He has never used smokeless tobacco.  Family History   Problem Relation Age of Onset   • Cancer Maternal Aunt         anal   • Diabetes Father    • Diabetes Paternal Grandfather    • Heart Disease Neg Hx    • Hypertension Neg Hx    • Hyperlipidemia Neg Hx    • Psychiatry Neg  Hx    • Stroke Neg Hx    • Thyroid Neg Hx         Objective:   /78 (BP Location: Left arm, Patient Position: Sitting, BP Cuff Size: Adult)   Pulse 70   Temp 36.9 °C (98.5 °F)   Resp 16   Ht 1.829 m (6')   Wt (!) 128.4 kg (283 lb)   SpO2 96%   BMI 38.38 kg/m²     Physical Exam   Constitutional: He is oriented to person, place, and time. He appears well-developed and well-nourished. No distress.   HENT:   Head: Normocephalic and atraumatic.   Eyes: Pupils are equal, round, and reactive to light. Conjunctivae are normal.   Neck: Normal range of motion. Neck supple. No tracheal deviation present.   Cardiovascular: Normal rate and regular rhythm.    Pulmonary/Chest: Effort normal and breath sounds normal.   Musculoskeletal:        Feet:    Neurological: He is alert and oriented to person, place, and time.   Skin: Skin is warm and dry. Capillary refill takes less than 2 seconds.   Psychiatric: He has a normal mood and affect. His behavior is normal.   Vitals reviewed.       FINDINGS:  No acute fracture or dislocation identified. Small posterior calcaneal spur formation.    Mild degenerative changes first MTP joint.   Impression       No acute fracture identified.           Assessment/Plan:     1. Right foot pain  DX-FOOT-COMPLETE 3+ RIGHT    ibuprofen (MOTRIN) 800 MG Tab     Per my read, no fracture seen on x-ray.  Agree with radiology report.     Start ibuprofen, rest, ice, elevating area. If sx persist pt can contact for sport med referral. Rec'd shoes with good support. Work note provided.     Follow-up with primary care provider within 7-10 days.  If symptoms worsen or persist patient can return to clinic for reevaluation. Patient verbalized understanding of information.

## 2019-03-04 NOTE — LETTER
March 4, 2019         Patient: Ross Ryder   YOB: 1975   Date of Visit: 3/4/2019           To Whom it May Concern:    Ross Ryder was seen in my clinic on 3/4/2019. He may return to work on 3/8/19 or sooner if symptoms improve.    If you have any questions or concerns, please don't hesitate to call.        Sincerely,           Shanita Andrade P.A.-C.  Electronically Signed

## 2019-03-19 ENCOUNTER — OFFICE VISIT (OUTPATIENT)
Dept: MEDICAL GROUP | Facility: MEDICAL CENTER | Age: 44
End: 2019-03-19
Payer: COMMERCIAL

## 2019-03-19 VITALS
BODY MASS INDEX: 37.62 KG/M2 | HEIGHT: 72 IN | SYSTOLIC BLOOD PRESSURE: 118 MMHG | HEART RATE: 75 BPM | DIASTOLIC BLOOD PRESSURE: 78 MMHG | WEIGHT: 277.78 LBS | OXYGEN SATURATION: 98 % | TEMPERATURE: 97.8 F

## 2019-03-19 DIAGNOSIS — M10.00 IDIOPATHIC GOUT, UNSPECIFIED CHRONICITY, UNSPECIFIED SITE: ICD-10-CM

## 2019-03-19 DIAGNOSIS — M54.32 LEFT SIDED SCIATICA: ICD-10-CM

## 2019-03-19 DIAGNOSIS — E66.9 OBESITY (BMI 30-39.9): ICD-10-CM

## 2019-03-19 DIAGNOSIS — M51.37 DDD (DEGENERATIVE DISC DISEASE), LUMBOSACRAL: ICD-10-CM

## 2019-03-19 DIAGNOSIS — E55.9 HYPOVITAMINOSIS D: ICD-10-CM

## 2019-03-19 DIAGNOSIS — E78.5 DYSLIPIDEMIA: ICD-10-CM

## 2019-03-19 DIAGNOSIS — R05.3 CHRONIC COUGH: ICD-10-CM

## 2019-03-19 DIAGNOSIS — Z00.00 HEALTH CARE MAINTENANCE: ICD-10-CM

## 2019-03-19 DIAGNOSIS — R73.01 IFG (IMPAIRED FASTING GLUCOSE): ICD-10-CM

## 2019-03-19 PROCEDURE — 99214 OFFICE O/P EST MOD 30 MIN: CPT | Performed by: INTERNAL MEDICINE

## 2019-03-19 RX ORDER — ALBUTEROL SULFATE 90 UG/1
2 AEROSOL, METERED RESPIRATORY (INHALATION) EVERY 6 HOURS PRN
Qty: 8.5 G | Refills: 3 | Status: SHIPPED | OUTPATIENT
Start: 2019-03-19 | End: 2019-04-12

## 2019-03-19 RX ORDER — FEXOFENADINE HCL 180 MG/1
180 TABLET ORAL DAILY
Qty: 90 TAB | Refills: 0 | Status: SHIPPED | OUTPATIENT
Start: 2019-03-19 | End: 2019-04-12

## 2019-03-19 RX ORDER — CYCLOBENZAPRINE HCL 5 MG
5-10 TABLET ORAL 3 TIMES DAILY PRN
Qty: 40 TAB | Refills: 1 | Status: SHIPPED | OUTPATIENT
Start: 2019-03-19 | End: 2019-04-12

## 2019-03-19 RX ORDER — OMEPRAZOLE 20 MG/1
20 CAPSULE, DELAYED RELEASE ORAL DAILY
Qty: 90 CAP | Refills: 1 | Status: SHIPPED | OUTPATIENT
Start: 2019-03-19 | End: 2019-04-12

## 2019-03-19 RX ORDER — MELOXICAM 15 MG/1
15 TABLET ORAL DAILY
Qty: 60 TAB | Refills: 0 | Status: SHIPPED | OUTPATIENT
Start: 2019-03-19 | End: 2019-04-12

## 2019-03-19 ASSESSMENT — PATIENT HEALTH QUESTIONNAIRE - PHQ9: CLINICAL INTERPRETATION OF PHQ2 SCORE: 0

## 2019-03-19 NOTE — PROGRESS NOTES
CHIEF COMPLAINT  Chief Complaint   Patient presents with   • Cough     Chronic Cough   Gout, DDD    HPI  Patient is a 44 y.o. male patient who presents today for the following     IFG  The patient had elevated FBG.  No polydipsia, polyphagia, polyuria.  No abdominal pain, weight loss, fatigue.  Diet: Regular  Exercise: < 4 d/w  BMI: 37  FH of DM:     Dyslipidemia  The patient had abnormal lipid panel, no medications.  Diet /Exercise/BMI:  As above  FH:     Hypovitaminosis D  The patient had low vitamin D level at 23.  He has been on vitamin D supplement.    Chronic cough, history of asthma  The patient has history of childhood asthma, he has not been on albuterol use for many years.  Complains of transient almost daily cough for the last few months, usually in the evening after he comes back from work.  He does not have any diagnosed allergy,   - has cat, without symptoms during the weekends in morning.    OBESITY, Body mass index is 37.67 kg/m².  Onset: early 40'  Diet/exercise/BMI: As above  No temperature intolerance. No change in hair/skin quality, BMs.   No HTN, buffalo hump, purple striae, flushing.  FH of obesity: neg    Gout / DDD / Left sciatica  The patient has history of degenerative disc disease of lumbar spine in previous imaging.   He complains of intermittent left-sided sciatica.  He had an episode of gout in 2017, and in the beginning of 3/2019, 2 weeks ago.  He had a uric acid normal in 2017.    Reviewed PMH, PSH, FH, SH, ALL, HCM/IMM, no changes  Reviewed MEDS, no changes    Patient Active Problem List    Diagnosis Date Noted   • IFG (impaired fasting glucose) 08/23/2017   • Obesity (BMI 30-39.9) 06/26/2017   • Health care maintenance 06/26/2017     CURRENT MEDICATIONS  Current Outpatient Prescriptions   Medication Sig Dispense Refill   • omeprazole (PRILOSEC) 20 MG delayed-release capsule Take 1 Cap by mouth every day. 90 Cap 1   • fexofenadine (ALLEGRA) 180 MG tablet Take 1 Tab by mouth every  day. 90 Tab 0   • cyclobenzaprine (FLEXERIL) 5 MG tablet Take 1-2 Tabs by mouth 3 times a day as needed. 40 Tab 1   • meloxicam (MOBIC) 15 MG tablet Take 1 Tab by mouth every day. 60 Tab 0   • albuterol 108 (90 Base) MCG/ACT Aero Soln inhalation aerosol Inhale 2 Puffs by mouth every 6 hours as needed for Shortness of Breath. 8.5 g 3   • Ibuprofen (MOTRIN PO) Take  by mouth.       No current facility-administered medications for this visit.      ALLERGIES  Allergies: Penicillin g  PAST MEDICAL HISTORY  Past Medical History:   Diagnosis Date   • IFG (impaired fasting glucose)    • Obesity      SURGICAL HISTORY  He  has no past surgical history on file.  SOCIAL HISTORY  Social History   Substance Use Topics   • Smoking status: Never Smoker   • Smokeless tobacco: Never Used   • Alcohol use No     Social History     Social History Narrative   • No narrative on file     FAMILY HISTORY  Family History   Problem Relation Age of Onset   • Cancer Maternal Aunt         anal   • Diabetes Father    • Diabetes Paternal Grandfather    • Allergies Mother    • Allergies Sister    • Heart Disease Neg Hx    • Hypertension Neg Hx    • Hyperlipidemia Neg Hx    • Psychiatry Neg Hx    • Stroke Neg Hx    • Thyroid Neg Hx      Family Status   Relation Status   • MAunt (Not Specified)   • Fa (Not Specified)   • PGFa (Not Specified)   • Mo (Not Specified)   • Sis (Not Specified)   • Neg Hx (Not Specified)     ROS   Constitutional: Negative for fever, chills.  HENT: Negative for congestion, sore throat.  Eyes: Negative for blurred vision.   Respiratory: As above.  Cardiovascular: Negative for chest pain, palpitations.   Gastrointestinal: Negative for heartburn,  abdominal pain.   Genitourinary: Negative for urinary problems.  Musculoskeletal: As above  Skin: Negative for rash.   Neuro: Negative for dizziness, headaches.   Endo/Heme/Allergies: Does not bruise/bleed easily.   Psychiatric/Behavioral: Negative for depression.    PHYSICAL EXAM    Blood pressure 118/78, pulse 75, temperature 36.6 °C (97.8 °F), temperature source Temporal, height 1.829 m (6'), weight (!) 126 kg (277 lb 12.5 oz), SpO2 98 %. Body mass index is 37.67 kg/m².  General:  NAD, well appearing  HEENT:   NC/AT, PERRLA, EOMI, TMs are clear. Oropharyngeal mucosa is pink,  without lesions;  no cervical / supraclavicular  lymphadenopathy, no thyromegaly.    Cardiovascular: RRR.   No m/r/g. No carotid bruits .      Lungs:   CTAB, no w/r/r, no respiratory distress.  Abdomen: Soft, NT/ND + BS; unable to palpate liver/spleen due to obesity.  Extremities:  2+ DP and radial pulses bilaterally.  No c/c/e.   Skin:  Warm, dry.  No erythema. No rash.   Neurologic: Alert & oriented x 3. CN II-XII grossly intact.  No focal deficits.  Psychiatric:  Affect normal, mood normal, judgment normal.  MS: No TTP over the spine or paraspinal muscles.    LABS     Labs are reviewed and discussed with a patient  Lab Results   Component Value Date/Time    CHOLSTRLTOT 182 07/23/2018 11:15 AM    LDL 99 07/23/2018 11:15 AM    HDL 40 07/23/2018 11:15 AM    TRIGLYCERIDE 216 (H) 07/23/2018 11:15 AM       Lab Results   Component Value Date/Time    SODIUM 137 08/22/2017 11:52 AM    POTASSIUM 4.0 08/22/2017 11:52 AM    CHLORIDE 106 08/22/2017 11:52 AM    CO2 25 08/22/2017 11:52 AM    GLUCOSE 198 (H) 07/23/2018 11:15 AM    BUN 11 08/22/2017 11:52 AM    CREATININE 1.02 08/22/2017 11:52 AM     Lab Results   Component Value Date/Time    ALKPHOSPHAT 70 08/22/2017 11:52 AM    ASTSGOT 38 08/22/2017 11:52 AM    ALTSGPT 81 (H) 08/22/2017 11:52 AM    TBILIRUBIN 0.5 08/22/2017 11:52 AM      Lab Results   Component Value Date/Time    HBA1C 5.9 (H) 08/22/2017 11:52 AM      Ref. Range 8/22/2017    25-OH   Vitamin D 25 30 - 100 ng/mL 23 (L)     Lab Results   Component Value Date/Time    WBC 6.9 08/22/2017 11:52 AM    RBC 6.05 08/22/2017 11:52 AM    HEMOGLOBIN 18.1 (H) 08/22/2017 11:52 AM    HEMATOCRIT 51.9 08/22/2017 11:52 AM    MCV 85.8  08/22/2017 11:52 AM    MCH 29.9 08/22/2017 11:52 AM    MCHC 34.9 08/22/2017 11:52 AM    MPV 10.8 08/22/2017 11:52 AM    NEUTSPOLYS 66.60 08/22/2017 11:52 AM    LYMPHOCYTES 22.50 08/22/2017 11:52 AM    MONOCYTES 7.30 08/22/2017 11:52 AM    EOSINOPHILS 2.60 08/22/2017 11:52 AM    BASOPHILS 0.60 08/22/2017 11:52 AM      IMAGING     None    ASSESMENT AND PLAN        1. IFG (impaired fasting glucose)  Discussed about risk to develop DM.   Advised low carb diet, exercise, watch for WT.   - Comp Metabolic Panel; Future  - HEMOGLOBIN A1C; Future    2. Dyslipidemia  Pending labs, advised low-calorie diet, daily exercise, weight control  - Comp Metabolic Panel; Future  - Lipid Profile; Future    3. Hypovitaminosis D  Pending labs, continue current supplement  - VITAMIN D,25 HYDROXY; Future    4. Chronic cough  My suspicion is that he has had reactivation of asthma/wheezing.  The best first treatment option would be albuterol when he comes home before he develops wheezing and to follow-up response.     If no response, advised the following:  - omeprazole (PRILOSEC) 20 MG delayed-release capsule; Take 1 Cap by mouth every day.  Dispense: 90 Cap; Refill: 1  - fexofenadine (ALLEGRA) 180 MG tablet; Take 1 Tab by mouth every day.  Dispense: 90 Tab; Refill: 0  - PULMONARY FUNCTION TESTS -Test requested: Spirometry with-out & with Bronchodilator; Future  - albuterol 108 (90 Base) MCG/ACT Aero Soln inhalation aerosol; Inhale 2 Puffs by mouth every 6 hours as needed for Shortness of Breath.  Dispense: 8.5 g; Refill: 3    5. Obesity (BMI 30-39.9)  - OBESITY COUNSELING (No Charge): Patient identified as having weight management issue.  Appropriate orders and counseling given.    6. Idiopathic gout, unspecified chronicity, unspecified site  Advised to increase fluid intake, avoid foods that can cause gout    7. DDD (degenerative disc disease), lumbosacral  Continue activity as tolerated, explained to start daily stretching exercise  -  cyclobenzaprine (FLEXERIL) 5 MG tablet; Take 1-2 Tabs by mouth 3 times a day as needed.  Dispense: 40 Tab; Refill: 1  - meloxicam (MOBIC) 15 MG tablet; Take 1 Tab by mouth every day.  Dispense: 60 Tab; Refill: 0    8. Left sided sciatica  As above  - cyclobenzaprine (FLEXERIL) 5 MG tablet; Take 1-2 Tabs by mouth 3 times a day as needed.  Dispense: 40 Tab; Refill: 1  - meloxicam (MOBIC) 15 MG tablet; Take 1 Tab by mouth every day.  Dispense: 60 Tab; Refill: 0    9. Health care maintenance  UTD    Counseling:   - Smoking:  Nonsmoker    Followup: within 3 months with labs    All questions are answered.    Please note that this dictation was created using voice recognition software, and that there might be errors of bryan and possibly content.

## 2019-04-12 ENCOUNTER — OFFICE VISIT (OUTPATIENT)
Dept: URGENT CARE | Facility: CLINIC | Age: 44
End: 2019-04-12
Payer: COMMERCIAL

## 2019-04-12 VITALS
OXYGEN SATURATION: 95 % | HEART RATE: 72 BPM | TEMPERATURE: 98.1 F | SYSTOLIC BLOOD PRESSURE: 126 MMHG | HEIGHT: 72 IN | BODY MASS INDEX: 37.52 KG/M2 | DIASTOLIC BLOOD PRESSURE: 72 MMHG | RESPIRATION RATE: 16 BRPM | WEIGHT: 277 LBS

## 2019-04-12 DIAGNOSIS — B37.0 THRUSH: Primary | ICD-10-CM

## 2019-04-12 PROCEDURE — 99214 OFFICE O/P EST MOD 30 MIN: CPT | Performed by: PHYSICIAN ASSISTANT

## 2019-04-12 ASSESSMENT — PAIN SCALES - GENERAL: PAINLEVEL: NO PAIN

## 2019-04-12 NOTE — PATIENT INSTRUCTIONS
Oral Thrush, Adult  Oral thrush is an infection in your mouth and throat. It causes white patches on your tongue and in your mouth.  Follow these instructions at home:  Helping with soreness  · To lessen your pain:  ¨ Drink cold liquids, like water and iced tea.  ¨ Eat frozen ice pops or frozen juices.  ¨ Eat foods that are easy to swallow, like gelatin and ice cream.  ¨ Drink from a straw if the patches in your mouth are painful.  General instructions  · Take or use over-the-counter and prescription medicines only as told by your doctor. Medicine for oral thrush may be something to swallow, or it may be something to put on the infected area.  · Eat plain yogurt that has live cultures in it. Read the label to make sure.  · If you wear dentures:  ¨ Take out your dentures before you go to bed.  ¨ Brush them well.  ¨ Soak them in a denture .  · Rinse your mouth with warm salt-water many times a day. To make the salt-water mixture, completely dissolve 1/2-1 teaspoon of salt in 1 cup of warm water.  Contact a doctor if:  · Your problems are getting worse.  · Your problems do not get better in less than 7 days with treatment.  · Your infection is spreading. This may show as white patches on the skin outside of your mouth.  · You are nursing your baby and you have redness and pain in the nipples.  This information is not intended to replace advice given to you by your health care provider. Make sure you discuss any questions you have with your health care provider.  Document Released: 03/14/2011 Document Revised: 09/11/2017 Document Reviewed: 09/11/2017  [employer] Interactive Patient Education © 2017 [employer]

## 2019-04-12 NOTE — PROGRESS NOTES
Subjective:      Pt is a 44 y.o. male who presents with Thrush (x1 week)            HPI  This is a new problem. Pt notes thrush on tongue x 7 days and believes it is stress induced with thick white coating on tongue. Pt has not taken any Rx medications for this condition. Pt states the pain is a 4/10, aching in nature and worse at night. Pt denies CP, SOB, NVD, paresthesias, headaches, dizziness, change in vision, hives, or other joint pain. The pt's medication list, problem list, and allergies have been evaluated and reviewed during today's visit.    PMH:  Past Medical History:   Diagnosis Date   • IFG (impaired fasting glucose)    • Obesity        PSH:  Negative per pt.      Fam Hx:    family history includes Allergies in his mother and sister; Cancer in his maternal aunt; Diabetes in his father and paternal grandfather.  Family Status   Relation Status   • MAunt (Not Specified)   • Fa (Not Specified)   • PGFa (Not Specified)   • Mo (Not Specified)   • Sis (Not Specified)   • Neg Hx (Not Specified)       Soc HX:  Social History     Social History   • Marital status: Single     Spouse name: N/A   • Number of children: N/A   • Years of education: N/A     Occupational History   • Not on file.     Social History Main Topics   • Smoking status: Never Smoker   • Smokeless tobacco: Never Used   • Alcohol use No      Comment: social   • Drug use: No   • Sexual activity: Not on file     Other Topics Concern   • Not on file     Social History Narrative   • No narrative on file         Medications:    Current Outpatient Prescriptions:   •  nystatin (MYCOSTATIN) 992570 UNIT/ML Suspension, Take 5 mL by mouth 4 times a day for 10 days., Disp: 200 mL, Rfl: 0  •  Ibuprofen (MOTRIN PO), Take  by mouth., Disp: , Rfl:       Allergies:  Penicillin g    ROS  Constitutional: Negative for fever, chills and malaise/fatigue.   HENT: Negative for congestion and sore throat.  +thrush on tongue with white coating  Eyes: Negative for blurred  vision, double vision and photophobia.   Respiratory: Negative for cough and shortness of breath.  Cardiovascular: Negative for chest pain and palpitations.   Gastrointestinal: Negative for heartburn, nausea, vomiting, abdominal pain, diarrhea and constipation.   Genitourinary: Negative for dysuria and flank pain.   Musculoskeletal: Negative for joint pain and myalgias.   Skin: Negative for itching and rash.   Neurological: Negative for dizziness, tingling and headaches.   Endo/Heme/Allergies: Does not bruise/bleed easily.   Psychiatric/Behavioral: Negative for depression. The patient is not nervous/anxious.           Objective:     /72   Pulse 72   Temp 36.7 °C (98.1 °F) (Temporal)   Resp 16   Ht 1.829 m (6')   Wt (!) 125.6 kg (277 lb)   SpO2 95%   BMI 37.57 kg/m²      Physical Exam   HENT:   Mouth/Throat: Uvula is midline and mucous membranes are normal. Posterior oropharyngeal edema present.             Constitutional: PT is oriented to person, place, and time. PT appears well-developed and well-nourished. No distress.   HENT:   Head: Normocephalic and atraumatic.   Eyes: Conjunctivae normal and EOM are normal. Pupils are equal, round, and reactive to light.   Neck: Normal range of motion. Neck supple. No thyromegaly present.   Cardiovascular: Normal rate, regular rhythm, normal heart sounds and intact distal pulses.  Exam reveals no gallop and no friction rub.    No murmur heard.  Pulmonary/Chest: Effort normal and breath sounds normal. No respiratory distress. PT has no wheezes. PT has no rales. Pt exhibits no tenderness.   Abdominal: Soft. Bowel sounds are normal. PT exhibits no distension and no mass. There is no tenderness. There is no rebound and no guarding.   Musculoskeletal: Normal range of motion. PT exhibits no edema and no tenderness.   Neurological: PT is alert and oriented to person, place, and time. PT has normal reflexes. No cranial nerve deficit.   Skin: Skin is warm and dry. No rash  noted. PT is not diaphoretic. No erythema.       Psychiatric: PT has a normal mood and affect. PT behavior is normal. Judgment and thought content normal.          Assessment/Plan:     1. Thrush    - nystatin (MYCOSTATIN) [id number] UNIT/ML Suspension; Take 5 mL by mouth 4 times a day for 10 days.  Dispense: 200 mL; Refill: 0    Rest, fluids encouraged.  OTC decongestant for congestion  AVS with medical info given.  Pt was in full understanding and agreement with the plan.  Differential diagnosis, natural history, supportive care, and indications for immediate follow-up discussed. All questions answered. Patient agrees with the plan of care.  Follow-up as needed if symptoms worsen or fail to improve.

## 2019-06-24 ENCOUNTER — HOSPITAL ENCOUNTER (OUTPATIENT)
Dept: LAB | Facility: MEDICAL CENTER | Age: 44
End: 2019-06-24
Payer: COMMERCIAL

## 2019-06-24 LAB
BDY FAT % MEASURED: 31.6 %
BP DIAS: 78 MMHG
BP SYS: 118 MMHG
CHOLEST SERPL-MCNC: 149 MG/DL (ref 100–199)
DIABETES HTDIA: NO
EVENT NAME HTEVT: NORMAL
FASTING HTFAS: YES
GLUCOSE SERPL-MCNC: 378 MG/DL (ref 65–99)
HDLC SERPL-MCNC: 28 MG/DL
HYPERTENSION HTHYP: NO
LDLC SERPL CALC-MCNC: 82 MG/DL
SCREENING LOC CITY HTCIT: NORMAL
SCREENING LOC STATE HTSTA: NORMAL
SCREENING LOCATION HTLOC: NORMAL
SMOKING HTSMO: NO
SUBSCRIBER ID HTSID: NORMAL
TRIGL SERPL-MCNC: 197 MG/DL (ref 0–149)

## 2019-06-24 PROCEDURE — 82947 ASSAY GLUCOSE BLOOD QUANT: CPT

## 2019-06-24 PROCEDURE — S5190 WELLNESS ASSESSMENT BY NONPH: HCPCS

## 2019-06-24 PROCEDURE — 36415 COLL VENOUS BLD VENIPUNCTURE: CPT

## 2019-06-24 PROCEDURE — 80061 LIPID PANEL: CPT

## 2019-06-29 DIAGNOSIS — R05.3 CHRONIC COUGH: ICD-10-CM

## 2019-07-01 RX ORDER — FEXOFENADINE HCL 180 MG/1
TABLET ORAL
Qty: 90 TAB | Refills: 3 | Status: SHIPPED | OUTPATIENT
Start: 2019-07-01 | End: 2019-10-08

## 2019-07-12 ENCOUNTER — APPOINTMENT (OUTPATIENT)
Dept: MEDICAL GROUP | Facility: MEDICAL CENTER | Age: 44
End: 2019-07-12
Payer: COMMERCIAL

## 2019-07-19 ENCOUNTER — OFFICE VISIT (OUTPATIENT)
Dept: MEDICAL GROUP | Facility: MEDICAL CENTER | Age: 44
End: 2019-07-19
Payer: COMMERCIAL

## 2019-07-19 ENCOUNTER — HOSPITAL ENCOUNTER (OUTPATIENT)
Dept: LAB | Facility: MEDICAL CENTER | Age: 44
End: 2019-07-19
Attending: INTERNAL MEDICINE
Payer: COMMERCIAL

## 2019-07-19 ENCOUNTER — HOME HEALTH ADMISSION (OUTPATIENT)
Dept: HOME HEALTH SERVICES | Facility: HOME HEALTHCARE | Age: 44
End: 2019-07-19
Payer: COMMERCIAL

## 2019-07-19 VITALS
HEIGHT: 72 IN | WEIGHT: 253.09 LBS | DIASTOLIC BLOOD PRESSURE: 70 MMHG | BODY MASS INDEX: 34.28 KG/M2 | OXYGEN SATURATION: 96 % | TEMPERATURE: 98 F | SYSTOLIC BLOOD PRESSURE: 120 MMHG | HEART RATE: 56 BPM

## 2019-07-19 DIAGNOSIS — E11.9 NEW ONSET TYPE 2 DIABETES MELLITUS (HCC): ICD-10-CM

## 2019-07-19 DIAGNOSIS — E78.5 DYSLIPIDEMIA: ICD-10-CM

## 2019-07-19 DIAGNOSIS — Z00.00 HEALTH CARE MAINTENANCE: ICD-10-CM

## 2019-07-19 DIAGNOSIS — E55.9 HYPOVITAMINOSIS D: ICD-10-CM

## 2019-07-19 DIAGNOSIS — E66.9 OBESITY (BMI 30.0-34.9): ICD-10-CM

## 2019-07-19 PROBLEM — M10.00 IDIOPATHIC GOUT: Status: ACTIVE | Noted: 2019-07-19

## 2019-07-19 LAB
25(OH)D3 SERPL-MCNC: 24 NG/ML (ref 30–100)
ALBUMIN SERPL BCP-MCNC: 4.6 G/DL (ref 3.2–4.9)
ALBUMIN/GLOB SERPL: 1.7 G/DL
ALP SERPL-CCNC: 95 U/L (ref 30–99)
ALT SERPL-CCNC: 94 U/L (ref 2–50)
ANION GAP SERPL CALC-SCNC: 11 MMOL/L (ref 0–11.9)
AST SERPL-CCNC: 57 U/L (ref 12–45)
BILIRUB SERPL-MCNC: 0.8 MG/DL (ref 0.1–1.5)
BUN SERPL-MCNC: 15 MG/DL (ref 8–22)
CALCIUM SERPL-MCNC: 9.6 MG/DL (ref 8.5–10.5)
CHLORIDE SERPL-SCNC: 100 MMOL/L (ref 96–112)
CHOLEST SERPL-MCNC: 175 MG/DL (ref 100–199)
CO2 SERPL-SCNC: 26 MMOL/L (ref 20–33)
CREAT SERPL-MCNC: 1.02 MG/DL (ref 0.5–1.4)
CREAT UR-MCNC: 87.8 MG/DL
EST. AVERAGE GLUCOSE BLD GHB EST-MCNC: 318 MG/DL
FASTING STATUS PATIENT QL REPORTED: NORMAL
GLOBULIN SER CALC-MCNC: 2.7 G/DL (ref 1.9–3.5)
GLUCOSE SERPL-MCNC: 328 MG/DL (ref 65–99)
HBA1C MFR BLD: 12.7 % (ref 0–5.6)
HDLC SERPL-MCNC: 36 MG/DL
LDLC SERPL CALC-MCNC: 109 MG/DL
MICROALBUMIN UR-MCNC: 0.8 MG/DL
MICROALBUMIN/CREAT UR: 9 MG/G (ref 0–30)
POTASSIUM SERPL-SCNC: 4 MMOL/L (ref 3.6–5.5)
PROT SERPL-MCNC: 7.3 G/DL (ref 6–8.2)
SODIUM SERPL-SCNC: 137 MMOL/L (ref 135–145)
TRIGL SERPL-MCNC: 152 MG/DL (ref 0–149)

## 2019-07-19 PROCEDURE — 82306 VITAMIN D 25 HYDROXY: CPT

## 2019-07-19 PROCEDURE — 80061 LIPID PANEL: CPT

## 2019-07-19 PROCEDURE — 99214 OFFICE O/P EST MOD 30 MIN: CPT | Performed by: INTERNAL MEDICINE

## 2019-07-19 PROCEDURE — 82043 UR ALBUMIN QUANTITATIVE: CPT

## 2019-07-19 PROCEDURE — 83036 HEMOGLOBIN GLYCOSYLATED A1C: CPT

## 2019-07-19 PROCEDURE — 80053 COMPREHEN METABOLIC PANEL: CPT

## 2019-07-19 PROCEDURE — 36415 COLL VENOUS BLD VENIPUNCTURE: CPT

## 2019-07-19 PROCEDURE — 82570 ASSAY OF URINE CREATININE: CPT

## 2019-07-19 RX ORDER — OMEPRAZOLE 20 MG/1
CAPSULE, DELAYED RELEASE ORAL
COMMUNITY
Start: 2019-06-14 | End: 2019-10-08

## 2019-07-19 NOTE — PROGRESS NOTES
CHIEF COMPLIANT:   Chief Complaint   Patient presents with   • Follow-Up   • Results     labs   DM    Ross Ryder is a 44 y.o. male here for DM follow up    DIABETES MELLITUS TYPE 2  Onset/   - c/o polydipsia, polyuria.  Diabetes education:  Given order    Medications:   · Metformin:  Start 1000 mg BID  · ACE/ARB: no  · Statin: no  · ASA: no  Compliant with medications: yes  Checking feet daily/wear soft socks/shoes: advised    Diabetes ABCDE TARGETS  · A1c, last:  Pending  · FB  · Fingersticks:  no  · Mean BS:  na  · Hypoglycemia:  na  · No symptoms of hypoglycemia: tremors, hunger, sometimes dizzy  · Blood Pressure, goal < 140/90: yes  · Cholesterol-Lipid Panel: elevate TGD  · Dysalbuminuria:  pending    Diet: regular  Exercise:  limited  BMI: 34    DM complications:  · Peripheral neuropathy: No numbness or tingling sensation in the feet.  · Retinopathy:      Last eye exam: pending, given referral. No retinopathy.    · Nephropathy:     No  · CVS:     No CAD.  · GI:     No gastropathy.    FH of DM:     Blood pressure  Wnl.  FH of HTN: neg    Dyslipidemia  No meds.  FH: neg    Reviewed PMH, PSH, FH, SH, ALL, IMM, MEDS.     Current medicines (including changes today)  Current Outpatient Prescriptions   Medication Sig Dispense Refill   • metformin (GLUCOPHAGE) 1000 MG tablet Take 1 Tab by mouth 2 times a day, with meals. 180 Tab 0   • omeprazole (PRILOSEC) 20 MG delayed-release capsule      • fexofenadine (ALLEGRA) 180 MG tablet TAKE 1 TABLET BY MOUTH EVERY DAY 90 Tab 3   • Ibuprofen (MOTRIN PO) Take  by mouth.       No current facility-administered medications for this visit.      Allergies: Penicillin g  He  has a past medical history of IFG (impaired fasting glucose) and Obesity.  He  has no past surgical history on file.  Social History   Substance Use Topics   • Smoking status: Never Smoker   • Smokeless tobacco: Never Used   • Alcohol use No      Comment: social     Social History     Social  History Narrative   • No narrative on file     Family History   Problem Relation Age of Onset   • Cancer Maternal Aunt         anal   • Diabetes Father    • Diabetes Paternal Grandfather    • Allergies Mother    • Allergies Sister    • Heart Disease Neg Hx    • Hypertension Neg Hx    • Hyperlipidemia Neg Hx    • Psychiatry Neg Hx    • Stroke Neg Hx    • Thyroid Neg Hx      Family Status   Relation Status   • MAunt (Not Specified)   • Fa (Not Specified)   • PGFa (Not Specified)   • Mo (Not Specified)   • Sis (Not Specified)   • Neg Hx (Not Specified)     ROS   Constitutional: Negative for fever, chills and weight loss.   HEENT: Negative for blurred vision, sore throat, swollen glands. No hearing loss or vertigo.  Respiratory: Negative for cough, wheezing, shortness of breath.   CVS: Negative for chest pain, palpitations, irregular heart beats, exertional dyspnea.   GI: Negative for heartburn, abdominal pain, nausea, vomiting, change in BMs.   : Negative for dysuria, polyuria.   MS: Negative for myalgias, joint pain.   Neuro: no headaches, numbness, weakness, seizures.   Skin: no skin lesions, rash.  Endocrine: per HPI.     PHYSICAL EXAM   /70   Pulse (!) 56   Temp 36.7 °C (98 °F) (Temporal)   Ht 1.829 m (6')   Wt 114.8 kg (253 lb 1.4 oz)   SpO2 96%  Body mass index is 34.32 kg/m².  Alert, oriented in no acute distress.  Eye contact is good, speech goal directed, affect bright.  HEENT: EOMI, PERRL, conjunctiva non-injected, sclera non-icteric.  Nares patent with no significant congestion or drainage.  Normal pinnae, external auditory canals, TM pearly gray with normal light reflex bilaterally.  Oral mucous membranes pink and moist with no lesions.  Neck supple with no cervical lymphadenopathy, JVD, palpable thyroid nodules or carotid bruits.  Lungs: clear to auscultation bilaterally with good excursion.  CV: regular rate and rhythm, without murmur, rubs, thrills, or gallops.  Abdomen: soft, non-distended,  non-tender with normal bowel sounds. No CVAT, No masses, or organomegaly.  Lower extremities: color normal, vascularity normal, no edema, temperature normal  Musculoskeletal: Normal gait. No obvious muscle weakness or wasting.  Psych: Normal mood and affect. Alert and oriented x3. Judgment and insight is normal.  Neuro: AAO x 3. CN 2-12 intact. No focal deficits.    LABS     Results reviewed and discussed with the patient, questions answered.    Last labs:  Lab Results   Component Value Date/Time    CHOLSTRLTOT 149 06/24/2019 06:17 AM    LDL 82 06/24/2019 06:17 AM    HDL 28 (A) 06/24/2019 06:17 AM    TRIGLYCERIDE 197 (H) 06/24/2019 06:17 AM       Lab Results   Component Value Date/Time    SODIUM 137 08/22/2017 11:52 AM    POTASSIUM 4.0 08/22/2017 11:52 AM    CHLORIDE 106 08/22/2017 11:52 AM    CO2 25 08/22/2017 11:52 AM    GLUCOSE 378 (H) 06/24/2019 06:17 AM    BUN 11 08/22/2017 11:52 AM    CREATININE 1.02 08/22/2017 11:52 AM     Lab Results   Component Value Date/Time    ALKPHOSPHAT 70 08/22/2017 11:52 AM    ASTSGOT 38 08/22/2017 11:52 AM    ALTSGPT 81 (H) 08/22/2017 11:52 AM    TBILIRUBIN 0.5 08/22/2017 11:52 AM      Lab Results   Component Value Date/Time    HBA1C 5.9 (H) 08/22/2017 11:52 AM     No results found for: TSH  No results found for: FREET4    Lab Results   Component Value Date/Time    WBC 6.9 08/22/2017 11:52 AM    RBC 6.05 08/22/2017 11:52 AM    HEMOGLOBIN 18.1 (H) 08/22/2017 11:52 AM    HEMATOCRIT 51.9 08/22/2017 11:52 AM    MCV 85.8 08/22/2017 11:52 AM    MCH 29.9 08/22/2017 11:52 AM    MCHC 34.9 08/22/2017 11:52 AM    MPV 10.8 08/22/2017 11:52 AM    NEUTSPOLYS 66.60 08/22/2017 11:52 AM    LYMPHOCYTES 22.50 08/22/2017 11:52 AM    MONOCYTES 7.30 08/22/2017 11:52 AM    EOSINOPHILS 2.60 08/22/2017 11:52 AM    BASOPHILS 0.60 08/22/2017 11:52 AM        ASSESMENT AND PLAN     1. New onset type 2 diabetes mellitus (HCC)  As bellow:  - metformin (GLUCOPHAGE) 1000 MG tablet; Take 1 Tab by mouth 2 times a  day, with meals.  Dispense: 180 Tab; Refill: 0  - Comp Metabolic Panel; Future  - HEMOGLOBIN A1C; Future  - MICROALBUMIN CREAT RATIO URINE; Future  - Lipid Profile; Future  - REFERRAL TO OPHTHALMOLOGY  - VITAMIN D,25 HYDROXY; Future  - REFERRAL TO HOME HEALTH  - REFERRAL TO DIABETIC EDUCATION Diabetes Self Management Education / Training (DSME/T) and Medical Nutrition Therapy (MNT): Initial Group DSME/MNT as authorized by payor, Follow-Up DSME/MNT as authorized by payor; DSME/T Content: Monitoring Diabete...    2. Dyslipidemia  No meds  - Lipid Profile; Future    3. Hypovitaminosis D  - VITAMIN D,25 HYDROXY; Future    4. Obesity (BMI 30.0-34.9)  - OBESITY COUNSELING (No Charge): Patient identified as having weight management issue.  Appropriate orders and counseling given.    5. Health care maintenance  Due TDAP    All questions are answered.    Smoking Counseling: Nonsmoker    Followup: Return in about 6 months (around 1/19/2020).

## 2019-07-22 ENCOUNTER — OFFICE VISIT (OUTPATIENT)
Dept: MEDICAL GROUP | Facility: MEDICAL CENTER | Age: 44
End: 2019-07-22
Payer: COMMERCIAL

## 2019-07-22 VITALS
HEIGHT: 72 IN | OXYGEN SATURATION: 94 % | WEIGHT: 256.84 LBS | HEART RATE: 65 BPM | DIASTOLIC BLOOD PRESSURE: 80 MMHG | BODY MASS INDEX: 34.79 KG/M2 | SYSTOLIC BLOOD PRESSURE: 118 MMHG | TEMPERATURE: 97.1 F

## 2019-07-22 DIAGNOSIS — Z00.00 HEALTH CARE MAINTENANCE: ICD-10-CM

## 2019-07-22 DIAGNOSIS — R74.01 TRANSAMINITIS: ICD-10-CM

## 2019-07-22 DIAGNOSIS — M54.32 LEFT SIDED SCIATICA: ICD-10-CM

## 2019-07-22 DIAGNOSIS — K76.0 FATTY LIVER: ICD-10-CM

## 2019-07-22 DIAGNOSIS — E78.5 DYSLIPIDEMIA: ICD-10-CM

## 2019-07-22 DIAGNOSIS — E11.9 NEW ONSET TYPE 2 DIABETES MELLITUS (HCC): ICD-10-CM

## 2019-07-22 DIAGNOSIS — E55.9 HYPOVITAMINOSIS D: ICD-10-CM

## 2019-07-22 DIAGNOSIS — M10.00 IDIOPATHIC GOUT, UNSPECIFIED CHRONICITY, UNSPECIFIED SITE: ICD-10-CM

## 2019-07-22 DIAGNOSIS — M51.37 DDD (DEGENERATIVE DISC DISEASE), LUMBOSACRAL: ICD-10-CM

## 2019-07-22 PROBLEM — R73.01 IFG (IMPAIRED FASTING GLUCOSE): Status: RESOLVED | Noted: 2017-08-23 | Resolved: 2019-07-22

## 2019-07-22 PROCEDURE — 99214 OFFICE O/P EST MOD 30 MIN: CPT | Performed by: INTERNAL MEDICINE

## 2019-07-22 RX ORDER — CYCLOBENZAPRINE HCL 10 MG
10 TABLET ORAL 3 TIMES DAILY PRN
COMMUNITY
End: 2019-07-22 | Stop reason: SDUPTHER

## 2019-07-22 RX ORDER — MELOXICAM 7.5 MG/1
7.5 TABLET ORAL DAILY
COMMUNITY
End: 2019-10-08

## 2019-07-22 RX ORDER — CYCLOBENZAPRINE HCL 10 MG
10 TABLET ORAL 3 TIMES DAILY PRN
Qty: 60 TAB | Refills: 0 | Status: SHIPPED | OUTPATIENT
Start: 2019-07-22 | End: 2019-10-08

## 2019-07-22 RX ORDER — CYCLOBENZAPRINE HCL 5 MG
5-10 TABLET ORAL 3 TIMES DAILY PRN
Qty: 40 TAB | Refills: 1 | Status: SHIPPED | OUTPATIENT
Start: 2019-07-22 | End: 2019-07-22

## 2019-07-22 RX ORDER — ERGOCALCIFEROL 1.25 MG/1
50000 CAPSULE ORAL
Qty: 12 CAP | Refills: 1 | Status: SHIPPED | OUTPATIENT
Start: 2019-07-22 | End: 2019-10-08 | Stop reason: SDUPTHER

## 2019-07-22 NOTE — PROGRESS NOTES
CHIEF COMPLAINT  Chief Complaint   Patient presents with   • Follow-Up     diabetes   • Medication Refill     refills   DM, new dg/labs    HPI  Ross Ryder is a 44 y.o. male who presents today for the following     DIABETES MELLITUS TYPE 2  Onset/              - c/o polydipsia, polyuria.  Diabetes education: Given order     Medications:   • Metformin: Start 1000 mg BID  • ACE/ARB: no  • Statin: no  • ASA: no  Compliant with medications: yes  Checking feet daily/wear soft socks/shoes: advised     Diabetes ABCDE TARGETS  • A1c, last: 12.7  • FB, then 328  • Fingersticks: no  • Mean BS: na  • Hypoglycemia: na  o No symptoms of hypoglycemia: tremors, hunger, sometimes dizzy  • Blood Pressure, goal < 140/90: yes  • Cholesterol-Lipid Panel: elevate TGD, borderline  • Dysalbuminuria:      Diet: regular  Exercise: limited  BMI: 34     DM complications:  • Peripheral neuropathy:           No numbness or tingling sensation in the feet.  • Retinopathy:                         Last eye exam: pending, given referral. No retinopathy.   • Nephropathy:                        No  • CVS:                                        No CAD.  • GI:                                           No gastropathy.     FH of DM:      Blood pressure  Wnl.  FH of HTN: neg     Dyslipidemia, fatty liver  No meds.  Imaging in the past showed fatty liver.  FH: neg     Hypovitaminosis D  The patient had low vitamin D level at 24.  Vitamin D supplement: no.    Left sciatica, gout  The patient has history of DDD of lumbar spine in previous imaging.   He complains of intermittent left-sided sciatica.  He had an episode of gout in 2017, and in the beginning of 3/2019, 2 weeks ago.  He had a uric acid normal in 2017.  Requested Flexeril refill     Reviewed PMH, PSH, FH, SH, ALL, HCM/IMM, no changes  Reviewed MEDS, no changes    Patient Active Problem List    Diagnosis Date Noted   • New onset type 2 diabetes mellitus (HCC) 2019   •  Transaminitis 07/22/2019   • Idiopathic gout 07/19/2019   • Hypovitaminosis D 03/19/2019   • Dyslipidemia 03/19/2019   • Chronic cough 03/19/2019   • DDD (degenerative disc disease), lumbosacral 03/19/2019   • Left sided sciatica 03/19/2019   • IFG (impaired fasting glucose) 08/23/2017   • Obesity (BMI 30-39.9) 06/26/2017   • Health care maintenance 06/26/2017     CURRENT MEDICATIONS  Current Outpatient Prescriptions   Medication Sig Dispense Refill   • cyclobenzaprine (FLEXERIL) 10 MG Tab Take 10 mg by mouth 3 times a day as needed.     • meloxicam (MOBIC) 7.5 MG Tab Take 7.5 mg by mouth every day.     • vitamin D, Ergocalciferol, (DRISDOL) 11428 units Cap capsule Take 1 Cap by mouth every 7 days. 12 Cap 1   • omeprazole (PRILOSEC) 20 MG delayed-release capsule      • metformin (GLUCOPHAGE) 1000 MG tablet Take 1 Tab by mouth 2 times a day, with meals. 180 Tab 0   • fexofenadine (ALLEGRA) 180 MG tablet TAKE 1 TABLET BY MOUTH EVERY DAY 90 Tab 3   • Ibuprofen (MOTRIN PO) Take  by mouth.       No current facility-administered medications for this visit.      ALLERGIES  Allergies: Penicillin g  PAST MEDICAL HISTORY  Past Medical History:   Diagnosis Date   • IFG (impaired fasting glucose)    • Obesity      SURGICAL HISTORY  He  has no past surgical history on file.  SOCIAL HISTORY  Social History   Substance Use Topics   • Smoking status: Never Smoker   • Smokeless tobacco: Never Used   • Alcohol use No      Comment: social     Social History     Social History Narrative   • No narrative on file     FAMILY HISTORY  Family History   Problem Relation Age of Onset   • Cancer Maternal Aunt         anal   • Diabetes Father    • Diabetes Paternal Grandfather    • Allergies Mother    • Allergies Sister    • Heart Disease Neg Hx    • Hypertension Neg Hx    • Hyperlipidemia Neg Hx    • Psychiatry Neg Hx    • Stroke Neg Hx    • Thyroid Neg Hx      Family Status   Relation Status   • MAunt (Not Specified)   • Fa (Not Specified)    • PGFa (Not Specified)   • Mo (Not Specified)   • Sis (Not Specified)   • Neg Hx (Not Specified)       ROS   Constitutional: Negative for fever, chills, fatigue.  HENT: Negative for congestion, sore throat.  Eyes: Negative for vision problems.   Respiratory: Negative for cough, shortness of breath.  Cardiovascular: Negative for chest pain, palpitations.   Gastrointestinal: Negative for heartburn, nausea, abdominal pain.   Genitourinary: Negative for dysuria.  Musculoskeletal: Negative for significant myalgia, back and joint pain.   Skin: Negative for rash.   Neuro: Negative for dizziness, weakness and headaches.   Endo/Heme/Allergies: Does not bruise/bleed easily.   Psychiatric/Behavioral: Negative for depression.    PHYSICAL EXAM   /80 (BP Location: Left arm)   Pulse 65   Temp 36.2 °C (97.1 °F) (Temporal)   Ht 1.829 m (6')   Wt 116.5 kg (256 lb 13.4 oz)   SpO2 94%  Body mass index is 34.83 kg/m².  General:  NAD, well appearing  HEENT:   NC/AT, PERRLA, EOMI, TMs are clear. Oropharyngeal mucosa is pink,  without lesions;  no cervical / supraclavicular  lymphadenopathy, no thyromegaly.    Cardiovascular: RRR.   No m/r/g.       Lungs:   CTAB, no w/r/r, no respiratory distress.  Abdomen: Soft, NT/ND; no hepatosplenomegaly.  Extremities:  2+ DP and radial pulses bilaterally.  No c/c/e.   Skin:  Warm, dry.  No erythema. No rash.   Neurologic: Alert & oriented x 3. CN II-XII grossly intact. No focal deficits.  Psychiatric:  Affect normal, mood normal, judgment normal.    Labs     Labs are reviewed and discussed with a patient  Lab Results   Component Value Date/Time    CHOLSTRLTOT 175 07/19/2019 10:52 AM     (H) 07/19/2019 10:52 AM    HDL 36 (A) 07/19/2019 10:52 AM    TRIGLYCERIDE 152 (H) 07/19/2019 10:52 AM       Lab Results   Component Value Date/Time    SODIUM 137 07/19/2019 10:52 AM    POTASSIUM 4.0 07/19/2019 10:52 AM    CHLORIDE 100 07/19/2019 10:52 AM    CO2 26 07/19/2019 10:52 AM    GLUCOSE 328  (H) 07/19/2019 10:52 AM    BUN 15 07/19/2019 10:52 AM    CREATININE 1.02 07/19/2019 10:52 AM     Lab Results   Component Value Date/Time    ALKPHOSPHAT 95 07/19/2019 10:52 AM    ASTSGOT 57 (H) 07/19/2019 10:52 AM    ALTSGPT 94 (H) 07/19/2019 10:52 AM    TBILIRUBIN 0.8 07/19/2019 10:52 AM      Lab Results   Component Value Date/Time    HBA1C 12.7 (H) 07/19/2019 10:52 AM    HBA1C 5.9 (H) 08/22/2017 11:52 AM     No results found for: TSH  No results found for: FREET4    Lab Results   Component Value Date/Time    WBC 6.9 08/22/2017 11:52 AM    RBC 6.05 08/22/2017 11:52 AM    HEMOGLOBIN 18.1 (H) 08/22/2017 11:52 AM    HEMATOCRIT 51.9 08/22/2017 11:52 AM    MCV 85.8 08/22/2017 11:52 AM    MCH 29.9 08/22/2017 11:52 AM    MCHC 34.9 08/22/2017 11:52 AM    MPV 10.8 08/22/2017 11:52 AM    NEUTSPOLYS 66.60 08/22/2017 11:52 AM    LYMPHOCYTES 22.50 08/22/2017 11:52 AM    MONOCYTES 7.30 08/22/2017 11:52 AM    EOSINOPHILS 2.60 08/22/2017 11:52 AM    BASOPHILS 0.60 08/22/2017 11:52 AM        Imaging     None    Assessment and Plan     Ross Ryder is a 44 y.o. male    1. New onset type 2 diabetes mellitus (HCC)  DM foot exam: negative    He has just started 500 mg of metformin twice daily, will increase to 1000 mg twice daily.  Advised low caloric diet, daily exercise, weight control  - Comp Metabolic Panel; Future  - HEMOGLOBIN A1C; Future    2. Dyslipidemia  Discussed about treatment options, will be followed  - Comp Metabolic Panel; Future  - Lipid Profile; Future    3. Transaminitis  Mild, follow-up labs probably fatty liver  - Comp Metabolic Panel; Future  4. Transaminitis  As above  - Comp Metabolic Panel; Future  - GAMMA GT (GGT); Future    5. Hypovitaminosis D  Start high-dose vitamin D  - VITAMIN D,25 HYDROXY; Future  - vitamin D, Ergocalciferol, (DRISDOL) 56316 units Cap capsule; Take 1 Cap by mouth every 7 days.  Dispense: 12 Cap; Refill: 1    6. DDD (degenerative disc disease), lumbosacral  Referred to PT, pending  imaging  - REFERRAL TO PHYSICAL THERAPY Reason for Therapy: Eval/Treat/Report  - DX-LUMBAR SPINE-2 OR 3 VIEWS; Future  - cyclobenzaprine (FLEXERIL) 10 MG Tab; Take 1 Tab by mouth 3 times a day as needed.  Dispense: 60 Tab; Refill: 0    7. Left sided sciatica  As above  - REFERRAL TO PHYSICAL THERAPY Reason for Therapy: Eval/Treat/Report  - DX-LUMBAR SPINE-2 OR 3 VIEWS; Future  - cyclobenzaprine (FLEXERIL) 10 MG Tab; Take 1 Tab by mouth 3 times a day as needed.  Dispense: 60 Tab; Refill: 0    8. Idiopathic gout, unspecified chronicity, unspecified site  No recent episode    9. Health care maintenance  Pending ophthalmology exam    Counseling:   - Smoking:  Nonsmoker    Followup: Return in about 3 months (around 10/22/2019) for DM-RN, DM.    All questions are answered.    Please note that this dictation was created using voice recognition software, and that there might be errors of bryan and possibly content.

## 2019-08-16 ENCOUNTER — PHYSICAL THERAPY (OUTPATIENT)
Dept: PHYSICAL THERAPY | Facility: MEDICAL CENTER | Age: 44
End: 2019-08-16
Attending: INTERNAL MEDICINE
Payer: COMMERCIAL

## 2019-08-16 DIAGNOSIS — M54.32 LEFT SIDED SCIATICA: ICD-10-CM

## 2019-08-16 DIAGNOSIS — M51.37 DDD (DEGENERATIVE DISC DISEASE), LUMBOSACRAL: ICD-10-CM

## 2019-08-16 PROCEDURE — 97161 PT EVAL LOW COMPLEX 20 MIN: CPT

## 2019-08-16 PROCEDURE — 97012 MECHANICAL TRACTION THERAPY: CPT

## 2019-08-16 ASSESSMENT — ENCOUNTER SYMPTOMS
PAIN SCALE AT LOWEST: 0
PAIN SCALE AT HIGHEST: 7
PAIN SCALE: 4
QUALITY: RADIATING
ALLEVIATING FACTOR: SITTING
QUALITY: ACHING

## 2019-08-16 NOTE — OP THERAPY EVALUATION
Outpatient Physical Therapy  INITIAL EVALUATION    [facility] Outpatient Physical Therapy  [address]  [place] [address]  Phone:  [phone]  Fax:  [phone]    Date of Evaluation: 2019    Patient: Ross Ryder  YOB: 1975  MRN: [id number]     Referring Provider: Lukasz Borrego M.D.  [address]  [address]  [place] [address]   Referring Diagnosis DDD (degenerative disc disease), lumbosacral [M51.37];Left sided sciatica [M54.32]     Time Calculation  Start time: 915  Stop time:  Time Calculation (min): 60 minutes       Physical Therapy Occurrence Codes    Date of onset of impairment:  16   Date physical therapy care plan established or reviewed:  19   Date physical therapy treatment started:  19          Chief Complaint: No chief complaint on file.    Visit Diagnoses     ICD-10-CM   1. DDD (degenerative disc disease), lumbosacral M51.37   2. Left sided sciatica M54.32         Subjective   History of Present Illness:     Date of onset:  2016    History of chief complaint:  Pt had acute pain in  and off and on since then. Pt has had pain the last 5 months. Starts in low back and radiates into lateral thigh on the left.     Pain:     Current pain ratin    At best pain ratin    At worst pain ratin    Location:  Left lower lumbar spine and lateral thigh    Quality:  Aching and radiating    Aggravating factors:  Left side lying, stairs, prolonged standing, sleep is disturbed, bending.     Relieving factors:  Sitting    Progression:  Unchanged    Activity Tolerance:     Current activity tolerance / Recreational activities:  Works 50-60 hours/week. Stands a lot. Steps limited 4.500 to 6,500. Previously did boot camp and was regular BMI. No regular exercise presently.    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 Averages 50 hours/week and sometimes more.     Social Support:      Lives in:  Multiple-level home    Lives with:  Young children    Hand Dominance:     Hand dominance:  Right    Treatments:     Treatments to date:  Muscle relaxors    Patient Goals:     Patient goals for therapy:  Decrease pain and be more active.       Past Medical History:   Diagnosis Date   • IFG (impaired fasting glucose)    • Obesity      No past surgical history on file.    Precautions:       Objective   Observation and functional movement:  Pt has high BMI, with distended abdomin and increased lordosis.     Range of motion and strength:    Active range of motion is within functional limits.    Strength is within functional limits.    Left side bending provokes mild low back pain.     Sensation and reflexes:     Sensation is intact.      Reflexes are normal and symmetrical.    Palpation and joint mobility:     Central PA's L3,4 tender with some radiation of pain into left gluteals. Pt has TTP of left glut medius/minimus.     Special tests:      Negative slump and SLR tests. Pt has a positive quadrant test on the left and mild pain and decreased IR in his left hip.             Therapeutic Exercises (CPT 03782):     1. Pt started on piriformis stretch, pelvic tilts and lumbar rotation to the right only      Therapeutic Exercise Summary: [facility] Code: [email or web address]   URL: [email or web address]   Date: 08/16/2019   Prepared by: Krysten Shadle     Exercises  · Supine Posterior Pelvic Tilt - 10 reps - 3 sets - 1x daily - 7x weekly  · Supine Piriformis Stretch with Foot on Ground - 3 reps - 1 sets - 30 seconds hold - 1x daily - 7x weekly  · Supine Lower Trunk Rotation - 3 reps - 1 sets - 30 seconds hold - 1 lumbar rotation - 1x daily - 7x weekly      Therapeutic Treatments and Modalities:     1. Mechanical Traction (CPT 70580), 110#/50# 60/20 with [facility]    Therapeutic Treatment and Modalities Summary:     Time-based treatments/modalities:          Assessment, Response and Plan:   Impairments: abnormal or restricted ROM,  lacks appropriate home exercise program and pain with function    Assessment details:  Mr. Ryder is a pleasant 44 year old male with history of left hip pain and radiculopathy. Pt appears to have a lumbar and hip component to his problem. Pt would benefit from skilled PT to address pain and dysfunction.   Prognosis: good    Goals:   Short Term Goals:   Pt will be compliant with daily HEP.  Pt will increase steps to 10k/day to help with weight loss.   Short term goal time span:  1-2 weeks      Long Term Goals:    Pt will be able to work all day without increase in hip or LE pain.   Pt able to sleep on his left side.  RMQ to improve by 50%.  Pt will join a gym and exercise routinely.   Long term goal time span:  4-6 weeks    Plan:   Therapy options:  Physical therapy treatment to continue  Planned therapy interventions:  E Stim Unattended (CPT 10310), Manual Therapy (CPT 42568), Mechanical Traction (CPT 29651) and Therapeutic Exercise (CPT 75090)  Other planned therapy interventions:  Dry needling  Frequency:  2x week  Duration in weeks:  6  Discussed with:  Patient      Functional Assessment Used  Jesus Vipin Low Back Pain and Disability Score: 25     Referring provider co-signature:  I have reviewed this plan of care and my co-signature certifies the need for services.  Certification Dates:   From 08/16/19     To 10-1-19    Physician Signature: ________________________________ Date: ______________

## 2019-08-23 ENCOUNTER — APPOINTMENT (OUTPATIENT)
Dept: PHYSICAL THERAPY | Facility: MEDICAL CENTER | Age: 44
End: 2019-08-23
Attending: INTERNAL MEDICINE
Payer: COMMERCIAL

## 2019-08-30 ENCOUNTER — PHYSICAL THERAPY (OUTPATIENT)
Dept: PHYSICAL THERAPY | Facility: MEDICAL CENTER | Age: 44
End: 2019-08-30
Attending: INTERNAL MEDICINE
Payer: COMMERCIAL

## 2019-08-30 DIAGNOSIS — M54.32 LEFT SIDED SCIATICA: ICD-10-CM

## 2019-08-30 DIAGNOSIS — M51.37 DDD (DEGENERATIVE DISC DISEASE), LUMBOSACRAL: ICD-10-CM

## 2019-08-30 PROCEDURE — 97014 ELECTRIC STIMULATION THERAPY: CPT

## 2019-08-30 PROCEDURE — 97140 MANUAL THERAPY 1/> REGIONS: CPT

## 2019-08-30 NOTE — OP THERAPY DAILY TREATMENT
Outpatient Physical Therapy  DAILY TREATMENT     [facility] Outpatient Physical Therapy  [address]  [place] [address]  Phone:  [phone]  Fax:  [phone]    Date: 08/30/2019    Patient: Ross Ryder  YOB: 1975  MRN: [id number]     Time Calculation  Start time: 0915  Stop time: 0947 Time Calculation (min): 32 minutes       Chief Complaint: back pain and hip pain  Visit #: 2    SUBJECTIVE:  Pt states his back was better for 3 days after traction. Pt has had some left hip and groin pain the last few days.     OBJECTIVE:    Pt agrees to risks and benefits of dry needling.     Muscles dry needled: left glut medius and minimus and L4,5 multifidus. Stim applied to needles for 5 minutes followed by moist hot pack.   No adverse side effects noted.         Exercises/Treatment  Time-based treatments/modalities:  Manual therapy minutes (CPT 59590): 15 minutes         ASSESSMENT:   Pt appears to have some left hip dysfunction.     PLAN/RECOMMENDATIONS:   Plan for treatment: therapy treatment to continue next visit.  Planned interventions for next visit: E-stim unattended (CPT 78169), manual therapy (CPT 65544) and therapeutic exercise (CPT 76228).

## 2019-09-06 ENCOUNTER — APPOINTMENT (OUTPATIENT)
Dept: PHYSICAL THERAPY | Facility: MEDICAL CENTER | Age: 44
End: 2019-09-06
Attending: INTERNAL MEDICINE
Payer: COMMERCIAL

## 2019-09-13 ENCOUNTER — PHYSICAL THERAPY (OUTPATIENT)
Dept: PHYSICAL THERAPY | Facility: MEDICAL CENTER | Age: 44
End: 2019-09-13
Attending: INTERNAL MEDICINE
Payer: COMMERCIAL

## 2019-09-13 ENCOUNTER — APPOINTMENT (OUTPATIENT)
Dept: PHYSICAL THERAPY | Facility: MEDICAL CENTER | Age: 44
End: 2019-09-13
Payer: COMMERCIAL

## 2019-09-13 DIAGNOSIS — M51.37 DDD (DEGENERATIVE DISC DISEASE), LUMBOSACRAL: ICD-10-CM

## 2019-09-13 DIAGNOSIS — M54.32 LEFT SIDED SCIATICA: ICD-10-CM

## 2019-09-13 PROCEDURE — 97012 MECHANICAL TRACTION THERAPY: CPT

## 2019-09-13 PROCEDURE — 97110 THERAPEUTIC EXERCISES: CPT

## 2019-09-13 PROCEDURE — 97140 MANUAL THERAPY 1/> REGIONS: CPT

## 2019-09-13 NOTE — OP THERAPY DAILY TREATMENT
Outpatient Physical Therapy  DAILY TREATMENT     [facility] Outpatient Physical Therapy  [address]  [place] [address]  Phone:  [phone]  Fax:  [phone]    Date: 09/13/2019    Patient: Ross Ryder  YOB: 1975  MRN: [id number]     Time Calculation  Start time: 1045  Stop time: 1130 Time Calculation (min): 45 minutes       Chief Complaint: Back Problem    Visit #: 3    SUBJECTIVE:  Had good results with traction and needling.  A good day and pretty good since first treatment.  Not as much pain.    OBJECTIVE:          Therapeutic Exercises (CPT 76513):     1. Upright bike, 4 min    2. Standing hip flexor stretch, 20 re(s, no long hold    3. Review of HEP    Therapeutic Treatments and Modalities:     1. Mechanical Traction (CPT 01549), 110#/50# 60/20 with mhp    2. Manual Therapy (CPT 23662), psoas/iliospoas release, Hip IR mobs, sidelying L/S rot mobs gr II    Time-based treatments/modalities:  Manual therapy minutes (CPT 71164): 20 minutes  Therapeutic exercise minutes (CPT 58674): 8 minutes         ASSESSMENT:   Response to treatment: notes some reduction in tightness in Left hip region    PLAN/RECOMMENDATIONS:   Plan for treatment: therapy treatment to continue next visit.  Planned interventions for next visit: continue with current treatment.

## 2019-09-20 ENCOUNTER — PHYSICAL THERAPY (OUTPATIENT)
Dept: PHYSICAL THERAPY | Facility: MEDICAL CENTER | Age: 44
End: 2019-09-20
Attending: INTERNAL MEDICINE
Payer: COMMERCIAL

## 2019-09-20 DIAGNOSIS — M51.37 DDD (DEGENERATIVE DISC DISEASE), LUMBOSACRAL: ICD-10-CM

## 2019-09-20 DIAGNOSIS — M54.32 LEFT SIDED SCIATICA: ICD-10-CM

## 2019-09-20 PROCEDURE — 97140 MANUAL THERAPY 1/> REGIONS: CPT

## 2019-09-20 PROCEDURE — 97012 MECHANICAL TRACTION THERAPY: CPT

## 2019-09-20 NOTE — OP THERAPY DAILY TREATMENT
Outpatient Physical Therapy  DAILY TREATMENT     [facility] Outpatient Physical Therapy  [address]  [place] [address]  Phone:  [phone]  Fax:  [phone]    Date: 09/20/2019    Patient: Ross Ryder  YOB: 1975  MRN: [id number]     Time Calculation  Start time: 0915  Stop time: 1000 Time Calculation (min): 45 minutes       Chief Complaint: hip and back pain  Visit #: 4    SUBJECTIVE:  Pt states his back and hip were pretty good for several days then his pain returned.     OBJECTIVE:            Therapeutic Treatments and Modalities:     1. Manual Therapy (CPT 18231), left hip caudal glides and lateral glides with belt, stw to glut med/min    2. Mechanical Traction (CPT 06742), 110#/60# 60/20 with mhp    Therapeutic Treatment and Modalities Summary: Hands and knees rocking into hip flexion with belt applying lateral hip glide.     Time-based treatments/modalities:  Manual therapy minutes (CPT 37150): 28 minutes         ASSESSMENT:   Pt continues to have both lumbar and hip components to his problem. Pt may benefit from physiatry consult.     PLAN/RECOMMENDATIONS:   Plan for treatment: therapy treatment to continue next visit.  Planned interventions for next visit: E-stim unattended (CPT 08863), manual therapy (CPT 38423) and therapeutic exercise (CPT 86787).

## 2019-09-24 ENCOUNTER — IMMUNIZATION (OUTPATIENT)
Dept: OCCUPATIONAL MEDICINE | Facility: CLINIC | Age: 44
End: 2019-09-24

## 2019-09-24 DIAGNOSIS — Z23 NEED FOR VACCINATION: ICD-10-CM

## 2019-09-25 DIAGNOSIS — M25.552 LEFT HIP PAIN: ICD-10-CM

## 2019-09-25 DIAGNOSIS — M54.32 LEFT SIDED SCIATICA: ICD-10-CM

## 2019-09-25 DIAGNOSIS — M51.37 DDD (DEGENERATIVE DISC DISEASE), LUMBOSACRAL: ICD-10-CM

## 2019-09-27 ENCOUNTER — PHYSICAL THERAPY (OUTPATIENT)
Dept: PHYSICAL THERAPY | Facility: MEDICAL CENTER | Age: 44
End: 2019-09-27
Attending: INTERNAL MEDICINE
Payer: COMMERCIAL

## 2019-09-27 DIAGNOSIS — M51.37 DDD (DEGENERATIVE DISC DISEASE), LUMBOSACRAL: ICD-10-CM

## 2019-09-27 DIAGNOSIS — M54.32 LEFT SIDED SCIATICA: ICD-10-CM

## 2019-09-27 PROCEDURE — 97110 THERAPEUTIC EXERCISES: CPT

## 2019-09-27 PROCEDURE — 97012 MECHANICAL TRACTION THERAPY: CPT

## 2019-09-27 PROCEDURE — 97140 MANUAL THERAPY 1/> REGIONS: CPT

## 2019-09-27 NOTE — OP THERAPY DAILY TREATMENT
Outpatient Physical Therapy  DAILY TREATMENT     [facility] Outpatient Physical Therapy  [address]  [place] [address]  Phone:  [phone]  Fax:  [phone]    Date: 09/27/2019    Patient: Ross Ryder  YOB: 1975  MRN: [id number]     Time Calculation  Start time: 0915  Stop time: 1000 Time Calculation (min): 45 minutes       Chief Complaint: hip and back pain  Visit #: 5    SUBJECTIVE:  Pt states he's feeling better this week with less sharp pain. Pt is using strap and doing quadriped stretching into flexion which seems to help anterior groin pain.     OBJECTIVE:      Therapeutic Exercises (CPT 46344):     1. Bike , 8 minutes    2. Hs curls with bridge on ball    3. Bridge on ball with hip flexion    4. Plank with hip extension, 10x    5. Leg press 6 plates, 40x    Therapeutic Treatments and Modalities:     1. Manual Therapy (CPT 48634), left hip caudal glides and lateral glides with belt, stw to glut med/min    2. Mechanical Traction (CPT 56664), 110#/60# 60/20 with [facility]    Therapeutic Treatment and Modalities Summary: Hands and knees rocking into hip flexion with belt applying lateral hip glide.     Time-based treatments/modalities:  Manual therapy minutes (CPT 10647): 12 minutes  Therapeutic exercise minutes (CPT 64031): 17 minutes           ASSESSMENT:   Pt had difficulty with posterior chain exercises: low endurance.     PLAN/RECOMMENDATIONS:   Plan for treatment: therapy treatment to continue next visit.  Planned interventions for next visit: manual therapy (CPT 85378), mechanical traction (CPT 33278) and therapeutic exercise (CPT 96896).

## 2019-09-30 ENCOUNTER — OFFICE VISIT (OUTPATIENT)
Dept: PHYSICAL MEDICINE AND REHAB | Facility: MEDICAL CENTER | Age: 44
End: 2019-09-30
Payer: COMMERCIAL

## 2019-09-30 VITALS
SYSTOLIC BLOOD PRESSURE: 114 MMHG | BODY MASS INDEX: 33.83 KG/M2 | DIASTOLIC BLOOD PRESSURE: 66 MMHG | HEART RATE: 73 BPM | WEIGHT: 249.78 LBS | HEIGHT: 72 IN | TEMPERATURE: 97.9 F | OXYGEN SATURATION: 95 %

## 2019-09-30 DIAGNOSIS — E11.9 NEW ONSET TYPE 2 DIABETES MELLITUS (HCC): ICD-10-CM

## 2019-09-30 DIAGNOSIS — M54.42 ACUTE BILATERAL LOW BACK PAIN WITH LEFT-SIDED SCIATICA: ICD-10-CM

## 2019-09-30 DIAGNOSIS — M25.552 LEFT HIP PAIN: ICD-10-CM

## 2019-09-30 DIAGNOSIS — E55.9 HYPOVITAMINOSIS D: ICD-10-CM

## 2019-09-30 PROCEDURE — 99205 OFFICE O/P NEW HI 60 MIN: CPT | Performed by: PHYSICAL MEDICINE & REHABILITATION

## 2019-09-30 ASSESSMENT — PATIENT HEALTH QUESTIONNAIRE - PHQ9
CLINICAL INTERPRETATION OF PHQ2 SCORE: 0
9. THOUGHTS THAT YOU WOULD BE BETTER OFF DEAD, OR OF HURTING YOURSELF: 0
7. TROUBLE CONCENTRATING ON THINGS, SUCH AS READING THE NEWSPAPER OR WATCHING TELEVISION: 0
2. FEELING DOWN, DEPRESSED, IRRITABLE, OR HOPELESS: 0
5. POOR APPETITE OR OVEREATING: 0
SUM OF ALL RESPONSES TO PHQ9 QUESTIONS 1 AND 2: 0
4. FEELING TIRED OR HAVING LITTLE ENERGY: 1
8. MOVING OR SPEAKING SO SLOWLY THAT OTHER PEOPLE COULD HAVE NOTICED. OR THE OPPOSITE, BEING SO FIGETY OR RESTLESS THAT YOU HAVE BEEN MOVING AROUND A LOT MORE THAN USUAL: 0
1. LITTLE INTEREST OR PLEASURE IN DOING THINGS: 0
SUM OF ALL RESPONSES TO PHQ QUESTIONS 1-9: 2
6. FEELING BAD ABOUT YOURSELF - OR THAT YOU ARE A FAILURE OR HAVE LET YOURSELF OR YOUR FAMILY DOWN: 0
3. TROUBLE FALLING OR STAYING ASLEEP OR SLEEPING TOO MUCH: 1

## 2019-09-30 ASSESSMENT — PAIN SCALES - GENERAL: PAINLEVEL: 8=MODERATE-SEVERE PAIN

## 2019-09-30 NOTE — PROGRESS NOTES
New patient note    Physiatry (physical medicine and  Rehabilitation), interventional spine and sports medicine    Date of Service: 9/30/2019    Chief complaint:   Chief Complaint   Patient presents with   • Follow-Up     Hip pain        HISTORY    HPI: Ross Ryder 44 y.o. male who presents today with Diagnoses of Acute bilateral low back pain with left-sided sciatica, Left hip pain, New onset type 2 diabetes mellitus (HCC), and Hypovitaminosis D were pertinent to this visit.    HPI  Pain in the left low back and associated left anterior lateral hip pain radiating down the left anterior leg to the medial aspect of the left knee.  The patient works as an interventional radiology technologist.  Pain is stable in the morning 1 out of 10 however by the afternoon it is 6-8/10 in intensity, aching and shooting in quality.  The patient does have a history of diabetes and is treated by his PCP for this.  From a pain standpoint he is tried ibuprofen, meloxicam, cyclobenzaprine with no significant improvement.  He is also been to physical therapy for 4 weeks with some improvement however the pain still returns.  He denies injections and surgery and related to the back and hip.                Medical records review:  I reviewed the note from the referring provider Lukasz Borrego, * including the note dated 7/19/2019 and 7/22/2019.  New onset type 2 diabetes on metformin, labs were ordered.  The patient was followed up for those labs.  Dyslipidemia with treatment options discussed, mild transaminitis is seen likely secondary to fatty liver according to the patient's PCP.  Supplementation for hypovitaminosis D was started with 50,000 units/week.  Future labs ordered..     I reviewed the physical therapy notes from Krysten Wong including the most recent note from 9/27/2019.       ROS:   Red Flags ROS:   Fever, Chills, Sweats: Denies  Involuntary Weight Loss: Denies  Bladder Incontinence: Denies  Bowel Incontinence:  denies  Saddle Anesthesia: Denies    All other systems reviewed and negative.       PMHx:   Past Medical History:   Diagnosis Date   • IFG (impaired fasting glucose)    • Obesity          Current Outpatient Medications on File Prior to Visit   Medication Sig Dispense Refill   • vitamin D, Ergocalciferol, (DRISDOL) 84307 units Cap capsule Take 1 Cap by mouth every 7 days. 12 Cap 1   • cyclobenzaprine (FLEXERIL) 10 MG Tab Take 1 Tab by mouth 3 times a day as needed. 60 Tab 0   • omeprazole (PRILOSEC) 20 MG delayed-release capsule      • metformin (GLUCOPHAGE) 1000 MG tablet Take 1 Tab by mouth 2 times a day, with meals. 180 Tab 0   • fexofenadine (ALLEGRA) 180 MG tablet TAKE 1 TABLET BY MOUTH EVERY DAY 90 Tab 3   • Ibuprofen (MOTRIN PO) Take  by mouth.     • meloxicam (MOBIC) 7.5 MG Tab Take 7.5 mg by mouth every day.       No current facility-administered medications on file prior to visit.         PSHx:   History reviewed. No pertinent surgical history.    Family history   Family History   Problem Relation Age of Onset   • Cancer Maternal Aunt         anal   • Diabetes Father    • Diabetes Paternal Grandfather    • Allergies Mother    • Allergies Sister    • Diabetes Sister    • Heart Disease Neg Hx    • Hypertension Neg Hx    • Hyperlipidemia Neg Hx    • Psychiatric Illness Neg Hx    • Stroke Neg Hx    • Thyroid Neg Hx          Medications: reviewed on epic.   Outpatient Medications Marked as Taking for the 9/30/19 encounter (Office Visit) with Edmund Neff M.D.   Medication Sig Dispense Refill   • vitamin D, Ergocalciferol, (DRISDOL) 99777 units Cap capsule Take 1 Cap by mouth every 7 days. 12 Cap 1   • cyclobenzaprine (FLEXERIL) 10 MG Tab Take 1 Tab by mouth 3 times a day as needed. 60 Tab 0   • omeprazole (PRILOSEC) 20 MG delayed-release capsule      • metformin (GLUCOPHAGE) 1000 MG tablet Take 1 Tab by mouth 2 times a day, with meals. 180 Tab 0   • fexofenadine (ALLEGRA) 180 MG tablet TAKE 1 TABLET BY MOUTH  EVERY DAY 90 Tab 3   • Ibuprofen (MOTRIN PO) Take  by mouth.          Allergies:   Allergies   Allergen Reactions   • Penicillin G        Social Hx:   Social History     Socioeconomic History   • Marital status: Single     Spouse name: Not on file   • Number of children: Not on file   • Years of education: Not on file   • Highest education level: Not on file   Occupational History   • Not on file   Social Needs   • Financial resource strain: Not on file   • Food insecurity:     Worry: Not on file     Inability: Not on file   • Transportation needs:     Medical: Not on file     Non-medical: Not on file   Tobacco Use   • Smoking status: Never Smoker   • Smokeless tobacco: Never Used   Substance and Sexual Activity   • Alcohol use: No     Alcohol/week: 0.0 oz     Comment: social   • Drug use: No   • Sexual activity: Not on file   Lifestyle   • Physical activity:     Days per week: Not on file     Minutes per session: Not on file   • Stress: Not on file   Relationships   • Social connections:     Talks on phone: Not on file     Gets together: Not on file     Attends Anabaptist service: Not on file     Active member of club or organization: Not on file     Attends meetings of clubs or organizations: Not on file     Relationship status: Not on file   • Intimate partner violence:     Fear of current or ex partner: Not on file     Emotionally abused: Not on file     Physically abused: Not on file     Forced sexual activity: Not on file   Other Topics Concern   •  Service No   • Blood Transfusions No   • Caffeine Concern No   • Occupational Exposure No   • Hobby Hazards No   • Sleep Concern No   • Stress Concern No   • Weight Concern No   • Special Diet No   • Back Care No   • Exercise Yes   • Bike Helmet No   • Seat Belt Yes   • Self-Exams Yes   Social History Narrative   • Not on file         EXAMINATION     Physical Exam:   Vitals: /66 (BP Location: Right arm, Patient Position: Sitting, BP Cuff Size: Adult  long)   Pulse 73   Temp 36.6 °C (97.9 °F) (Temporal)   Ht 1.829 m (6')   Wt 113.3 kg (249 lb 12.5 oz)   SpO2 95%     Constitutional:   Body Habitus: Body mass index is 33.88 kg/m².  Cooperation: Fully cooperates with exam  Appearance: Well-groomed, well-nourished, not disheveled     Eyes: No scleral icterus to suggest severe liver disease, no proptosis to suggest severe hyperthyroid    ENT -no obvious auditory deficits, no obvious tongue lesions, tongue midline, no facial droop     Skin -no rashes or lesions noted     Respiratory-  breathing comfortable on room air, no audible wheezing    Cardiovascular- capillary refills less than 2 seconds. No lower extremity edema is noted.     Gastrointestinal - no obvious abdominal masses, No tenderness to palpation in the abdomen    Psychiatric- alert and oriented ×3. Normal affect.     Gait - normal gait, no use of ambulatory device, nonantalgic. the patient can toe walk with ease. the patient can heel walk with ease. the patient can tandem walk with ease. balance is appropriate..     Musculoskeletal -     Thoracic/Lumbar Spine/Sacral Spine/Hips   Inspection: No evidence of atrophy in bilateral lower extremities throughout     ROM: full  active range of motion with flexion, lateral flexion, and rotation bilaterally.   There is full  active range of motion with lumbar extension.    There is no pain with lumbar extension.     Palpation:   No tenderness to palpation in midline at T1-T12 levels. No tenderness to palpation in the left and right of the midline T1-L5, NEGATIVE for tenderness to palpation to the para-midline region in the lower lumbar levels.  palpation over SI joint: negative bilaterally    palpation over buttock: negative bilaterally    palpation in hip or over the greater trochanters: negative bilaterally      Lumbar spine Special tests  Neuro tension  Straight leg test negative bilaterally    Slump test negative bilaterally      HIP  FAIR test negative  right, positive left    Range of motion in the hips is within mildly decreased bilaterally especially with internal rotation left worse than right    SI joint tests  Observation patient sits on one buttocks: Negative  Thigh thrust test negative bilaterally    NAKITA test negative bilaterally       Neuro       Key points for the international standards for neurological classification of spinal cord injury (ISNCSCI) to light touch.     Dermatome R L                                      L2 2 2   L3 2 2   L4 2 2   L5 2 2   S1 2 2   S2 2 2         Motor Exam Lower Extremities    ? Myotome R L   Hip flexion L2 5 5   Knee extension L3 5 5   Ankle dorsiflexion L4 5 5   Toe extension L5 5 5   Ankle plantarflexion S1 5 5           Trevino’s sign negative bilaterally   Babinski sign negative bilaterally   Clonus of the ankle negative bilaterally     Reflexes were unlikely to be useful given the distribution of pain in L2 or L3 distribution.    MEDICAL DECISION MAKING    Medical records review: see under HPI section.     DATA    Labs:   Lab Results   Component Value Date/Time    SODIUM 137 07/19/2019 10:52 AM    POTASSIUM 4.0 07/19/2019 10:52 AM    CHLORIDE 100 07/19/2019 10:52 AM    CO2 26 07/19/2019 10:52 AM    ANION 11.0 07/19/2019 10:52 AM    GLUCOSE 328 (H) 07/19/2019 10:52 AM    BUN 15 07/19/2019 10:52 AM    CREATININE 1.02 07/19/2019 10:52 AM    CALCIUM 9.6 07/19/2019 10:52 AM    ASTSGOT 57 (H) 07/19/2019 10:52 AM    ALTSGPT 94 (H) 07/19/2019 10:52 AM    TBILIRUBIN 0.8 07/19/2019 10:52 AM    ALBUMIN 4.6 07/19/2019 10:52 AM    TOTPROTEIN 7.3 07/19/2019 10:52 AM    GLOBULIN 2.7 07/19/2019 10:52 AM    AGRATIO 1.7 07/19/2019 10:52 AM   ]    No results found for: PROTHROMBTM, INR     Lab Results   Component Value Date/Time    WBC 6.9 08/22/2017 11:52 AM    RBC 6.05 08/22/2017 11:52 AM    HEMOGLOBIN 18.1 (H) 08/22/2017 11:52 AM    HEMATOCRIT 51.9 08/22/2017 11:52 AM    MCV 85.8 08/22/2017 11:52 AM    MCH 29.9 08/22/2017 11:52  AM    MCHC 34.9 08/22/2017 11:52 AM    MPV 10.8 08/22/2017 11:52 AM    NEUTSPOLYS 66.60 08/22/2017 11:52 AM    LYMPHOCYTES 22.50 08/22/2017 11:52 AM    MONOCYTES 7.30 08/22/2017 11:52 AM    EOSINOPHILS 2.60 08/22/2017 11:52 AM    BASOPHILS 0.60 08/22/2017 11:52 AM        Lab Results   Component Value Date/Time    HBA1C 12.7 (H) 07/19/2019 10:52 AM        Imaging:   I personally reviewed following images, these are my reads  This MRI right knee 10/13/2017  Suprapatellar effusion is present.  Cartilage defect in the trochlea.    IMAGING radiology reads. I reviewed the following radiology reads             MRI right knee without contrast 10/13/2017  Impression       1.  Full-thickness articular cartilage fissure in the inferior central trochlea with probable delamination of the adjacent articular cartilage resulting in a flap tear and adjacent marrow edema.    2.  Fraying of the free edge of the posterior horn and body of the medial meniscus with abnormal internal signal, suspicious for a tear.    3.  Small knee effusion.                                                                                                                                   Results for orders placed in visit on 03/04/19   DX-FOOT-COMPLETE 3+ RIGHT    Impression No acute fracture identified.                                                                     Diagnosis   Visit Diagnoses     ICD-10-CM   1. Acute bilateral low back pain with left-sided sciatica M54.42   2. Left hip pain M25.552   3. New onset type 2 diabetes mellitus (HCC) E11.9   4. Hypovitaminosis D E55.9           ASSESSMENT AND PLAN:  Ross Ryder 44 y.o. male      Ross was seen today for follow-up.    Diagnoses and all orders for this visit:    Acute bilateral low back pain with left-sided sciatica  -     DX-HIP-BILATERAL-WITH PELVIS-3/4 VIEWS; Future  -     MR-LUMBAR SPINE-W/O; Future    Left hip pain  -     DX-HIP-BILATERAL-WITH PELVIS-3/4 VIEWS; Future  -      MR-LUMBAR SPINE-W/O; Future    New onset type 2 diabetes mellitus (HCC)  Comments:  followed by PCP. future labs ordered.     Hypovitaminosis D  Comments:  agree with supplementation and follow up lab        -The patient left-sided low back pain radiating to the left groin and to the left anterior leg to the medial knee suggest an L2 or L3 lumbar radiculopathy versus intra-articular hip pathology.  On exam there is signs of hip impingement.  An MRI of the lumbar spine and x-ray of the hip will be helpful to differentiate these 2 etiologies.  Given the patient's new diagnosis of uncontrolled type 2 diabetes I would like to hold off on steroid injections until the diabetes has improved control.        Follow-up: After the above diagnostic studies         Please note that this dictation was created using voice recognition software. I have made every reasonable attempt to correct obvious errors but there may be errors of grammar and content that I may have overlooked prior to finalization of this note.      Edmund Neff MD  Physical Medicine and Rehabilitation  Interventional Spine and Sports Physiatry  [facility]               CC Leeroy-Lukasz Sosa, *

## 2019-09-30 NOTE — Clinical Note
Dear Lukasz Borrego M.D. , Thank you for the referral of Ross Ryder.  Please see my note for more details Should you have any questions or concerns please do not hesitate to contact me. Edmund Neff M.D.

## 2019-10-04 ENCOUNTER — HOSPITAL ENCOUNTER (OUTPATIENT)
Dept: RADIOLOGY | Facility: MEDICAL CENTER | Age: 44
End: 2019-10-04
Attending: PHYSICAL MEDICINE & REHABILITATION
Payer: COMMERCIAL

## 2019-10-04 DIAGNOSIS — M25.552 LEFT HIP PAIN: ICD-10-CM

## 2019-10-04 DIAGNOSIS — M54.42 ACUTE BILATERAL LOW BACK PAIN WITH LEFT-SIDED SCIATICA: ICD-10-CM

## 2019-10-04 PROCEDURE — 73522 X-RAY EXAM HIPS BI 3-4 VIEWS: CPT

## 2019-10-04 PROCEDURE — 72148 MRI LUMBAR SPINE W/O DYE: CPT

## 2019-10-04 PROCEDURE — 90686 IIV4 VACC NO PRSV 0.5 ML IM: CPT | Performed by: PREVENTIVE MEDICINE

## 2019-10-07 NOTE — PROGRESS NOTES
RN-CDE Note    Subjective:     Health changes since last visit/interval Hx: Ross is a new patient to me.  He has type II DM treated with metformin    Medications (including changes made today)  Current Outpatient Medications   Medication Sig Dispense Refill   • vitamin D, Ergocalciferol, (DRISDOL) 35773 units Cap capsule Take 1 Cap by mouth every 7 days. 12 Cap 1   • metformin (GLUCOPHAGE) 1000 MG tablet Take 1 Tab by mouth 2 times a day, with meals. 180 Tab 0   • Ibuprofen (MOTRIN PO) Take  by mouth.       No current facility-administered medications for this visit.        Taking daily ASA: No  Taking above medications as prescribed: yes  SIDE EFFECTS: Patient denies side effects to medications    Exercise: light stretching, limited by back injury  Diet: meals per day on average: 2-3 snacks between  Patient's body mass index is 34.35 kg/m². Exercise and nutrition counseling were performed at this visit.      Health Maintenance:   Health Maintenance Due   Topic Date Due   • IMM PNEUMOCOCCAL VACCINE: 0-64 Years (1 of 1 - PPSV23) 01/28/1979   • IMM HEP B VACCINE (1 of 3 - Risk 3-dose series) 01/28/1992   • IMM DTaP/Tdap/Td Vaccine (1 - Tdap) 12/09/2005       Immunizations:   PPSV23: Due  [id number]: N/A  Tdap: Due  Flu: Up-to-date  Hep B: Due    DM:   Last A1c:   Lab Results   Component Value Date/Time    HBA1C 12.7 (H) 07/19/2019 10:52 AM      A1C GOAL: < 7    Glucose monitoring frequency: twice daily    Hypoglycemic episodes: no    Last Retinal Exam: on file and up-to-date  Daily Foot Exam: Yes advised  Routine Dental Exams: Yes    Lab Results   Component Value Date/Time    MALBCRT 9 07/19/2019 10:52 AM    MICROALBUR 0.8 07/19/2019 10:52 AM        ACR Albumin/Creatinine Ratio goal <30     HTN:   Blood pressure goal <140/<80 yes.   Currently Rx ACE/ARB: No    Dyslipidemia:    Lab Results   Component Value Date/Time    CHOLSTRLTOT 175 07/19/2019 10:52 AM     (H) 07/19/2019 10:52 AM    HDL 36 (A)  07/19/2019 10:52 AM    TRIGLYCERIDE 152 (H) 07/19/2019 10:52 AM       Lab Results   Component Value Date/Time    SODIUM 137 07/19/2019 10:52 AM    POTASSIUM 4.0 07/19/2019 10:52 AM    CHLORIDE 100 07/19/2019 10:52 AM    CO2 26 07/19/2019 10:52 AM    GLUCOSE 328 (H) 07/19/2019 10:52 AM    BUN 15 07/19/2019 10:52 AM    CREATININE 1.02 07/19/2019 10:52 AM     Lab Results   Component Value Date/Time    ALKPHOSPHAT 95 07/19/2019 10:52 AM    ASTSGOT 57 (H) 07/19/2019 10:52 AM    ALTSGPT 94 (H) 07/19/2019 10:52 AM    TBILIRUBIN 0.8 07/19/2019 10:52 AM        Currently Rx Statin: No    He  reports that he has never smoked. He has never used smokeless tobacco.    Objective:     Exam:  Monofilament: not done    Plan:     Discussed and educated on:   - All medications, side effects and compliance (discussed carefully)  - Annual eye examinations at Ophthalmology  - Diabetic Meal Plan: foods that contain carbs and plate method  - Factors Affecting Blood Glucose Control: food, illness, medication and stress  - Foot Care: what to look for when checking feet every day and when to contact HCP  - HbA1C: target and what A1C is  - Home glucose monitoring emphasized  - Interpretation of Lab Results  - Long term diabetic complications  - Reminded pt to bring in BS diary at next visit  - Testing Blood Glucose: when to test, recording blood sugars, target range for FSBS and when to contact HCP  - Weight control and daily exercise    Recommended medication changes: no change.  When prescription vitamin D complete, start OTC vitamin D3 0939-6934 iu daily

## 2019-10-08 ENCOUNTER — OFFICE VISIT (OUTPATIENT)
Dept: MEDICAL GROUP | Facility: MEDICAL CENTER | Age: 44
End: 2019-10-08
Payer: COMMERCIAL

## 2019-10-08 VITALS
TEMPERATURE: 97.8 F | DIASTOLIC BLOOD PRESSURE: 78 MMHG | SYSTOLIC BLOOD PRESSURE: 106 MMHG | OXYGEN SATURATION: 96 % | WEIGHT: 253.31 LBS | HEIGHT: 72 IN | HEART RATE: 64 BPM | BODY MASS INDEX: 34.31 KG/M2

## 2019-10-08 DIAGNOSIS — Z00.00 HEALTHCARE MAINTENANCE: ICD-10-CM

## 2019-10-08 DIAGNOSIS — E55.9 HYPOVITAMINOSIS D: ICD-10-CM

## 2019-10-08 DIAGNOSIS — E11.9 NEW ONSET TYPE 2 DIABETES MELLITUS (HCC): ICD-10-CM

## 2019-10-08 DIAGNOSIS — E66.9 OBESITY (BMI 30-39.9): ICD-10-CM

## 2019-10-08 DIAGNOSIS — E78.5 DYSLIPIDEMIA: ICD-10-CM

## 2019-10-08 DIAGNOSIS — K76.0 FATTY LIVER: ICD-10-CM

## 2019-10-08 DIAGNOSIS — R74.01 TRANSAMINITIS: ICD-10-CM

## 2019-10-08 DIAGNOSIS — Z23 NEED FOR VACCINATION: ICD-10-CM

## 2019-10-08 LAB
HBA1C MFR BLD: 5.6 % (ref 0–5.6)
INT CON NEG: NORMAL
INT CON POS: NORMAL

## 2019-10-08 PROCEDURE — 99214 OFFICE O/P EST MOD 30 MIN: CPT | Mod: 25 | Performed by: INTERNAL MEDICINE

## 2019-10-08 PROCEDURE — 90715 TDAP VACCINE 7 YRS/> IM: CPT | Performed by: INTERNAL MEDICINE

## 2019-10-08 PROCEDURE — 90732 PPSV23 VACC 2 YRS+ SUBQ/IM: CPT | Performed by: INTERNAL MEDICINE

## 2019-10-08 PROCEDURE — 90472 IMMUNIZATION ADMIN EACH ADD: CPT | Performed by: INTERNAL MEDICINE

## 2019-10-08 PROCEDURE — 83036 HEMOGLOBIN GLYCOSYLATED A1C: CPT | Performed by: INTERNAL MEDICINE

## 2019-10-08 PROCEDURE — 90471 IMMUNIZATION ADMIN: CPT | Performed by: INTERNAL MEDICINE

## 2019-10-08 RX ORDER — ERGOCALCIFEROL 1.25 MG/1
50000 CAPSULE ORAL
Qty: 12 CAP | Refills: 0 | Status: SHIPPED | OUTPATIENT
Start: 2019-10-08 | End: 2020-01-15 | Stop reason: SDUPTHER

## 2019-10-09 NOTE — PROGRESS NOTES
CHIEF COMPLAINT  Chief Complaint   Patient presents with   • Diabetes   With DM [facility]  Ross Ryder is a 44 y.o. male who presents today for the following     New onset DM2  Interval course:  - A1c 5.6, was 12.7    Onset/              - c/o polydipsia, polyuria.  Diabetes education: Given order     Medications:   · Metformin: Start 1000 mg BID  · ACE/ARB: no  · Statin: no  · ASA: no  Compliant with medications: yes  Checking feet daily/wear soft socks/shoes: advised     DM:  · A1c, last: 5.6, was 12.7  · Fingersticks: no  · Mean BS: na  · Hypoglycemia: na  ? No symptoms of hypoglycemia: tremors, hunger, sometimes dizzy  · Blood Pressure, goal < 140/90: yes  · Cholesterol-Lipid Panel: elevate TGD, borderline  · Dysalbuminuria:      Diet: regular  Exercise: limited  BMI: 34     DM complications:  · Peripheral neuropathy:           No numbness or tingling sensation in the feet.  · Retinopathy:                         Last eye exam: pending, given referral. No retinopathy.   · Nephropathy:                        No  · CVS:                                        No CAD.  · GI:                                           No gastropathy.     FH of DM: father, sister     Blood pressure  Within normal limits.  FH of HTN: neg     Dyslipidemia, fatty liver, transaminitis  No meds.  Imaging in the past showed fatty liver.  FH: neg      Hypovitaminosis D  The patient had low vitamin D level at 24.  Vitamin D supplement: no.     Reviewed PMH, PSH, FH, SH, ALL, HCM/IMM, no changes  Reviewed MEDS, no changes    Patient Active Problem List    Diagnosis Date Noted   • New onset type 2 diabetes mellitus (HCC) 2019   • Transaminitis 2019   • Idiopathic gout 2019   • Hypovitaminosis D 2019   • Dyslipidemia 2019   • Chronic cough 2019   • DDD (degenerative disc disease), lumbosacral 2019   • Left sided sciatica 2019   • Obesity (BMI 30-39.9) 2017   • Health care  maintenance 06/26/2017     CURRENT MEDICATIONS  Current Outpatient Medications   Medication Sig Dispense Refill   • vitamin D, Ergocalciferol, (DRISDOL) 77930 units Cap capsule Take 1 Cap by mouth every 7 days. 12 Cap 0   • metformin (GLUCOPHAGE) 1000 MG tablet Take 1 Tab by mouth 2 times a day, with meals. 180 Tab 0   • Ibuprofen (MOTRIN PO) Take  by mouth.       No current facility-administered medications for this visit.      ALLERGIES  Allergies: Penicillin g  PAST MEDICAL HISTORY  Past Medical History:   Diagnosis Date   • IFG (impaired fasting glucose)    • Obesity      SURGICAL HISTORY  He  has no past surgical history on file.  SOCIAL HISTORY  Social History     Tobacco Use   • Smoking status: Never Smoker   • Smokeless tobacco: Never Used   Substance Use Topics   • Alcohol use: No     Alcohol/week: 0.0 oz     Comment: social   • Drug use: No     Social History     Social History Narrative   • Not on file     FAMILY HISTORY  Family History   Problem Relation Age of Onset   • Cancer Maternal Aunt         anal   • Diabetes Father    • Diabetes Paternal Grandfather    • Allergies Mother    • Allergies Sister    • Diabetes Sister    • Heart Disease Neg Hx    • Hypertension Neg Hx    • Hyperlipidemia Neg Hx    • Psychiatric Illness Neg Hx    • Stroke Neg Hx    • Thyroid Neg Hx      Family Status   Relation Name Status   • MAunt  (Not Specified)   • Fa  (Not Specified)   • PGFa  (Not Specified)   • Mo  (Not Specified)   • Sis  (Not Specified)   • Neg Hx  (Not Specified)     ROS   Constitutional: Negative for fatigue.  HENT: Negative for congestion.  Eyes: Negative for vision problems.   Respiratory: Negative for shortness of breath.  Cardiovascular: Negative for chest pain, palpitations.   Gastrointestinal: Negative for nausea, abdominal pain.   Genitourinary: Negative for polyuria.  Musculoskeletal: has gout.   Skin: Negative for rash.   Neuro: Negative for dizziness, weakness and headaches.    Endo/Heme/Allergies: Does not bruise/bleed easily.   Psychiatric/Behavioral: Negative for depression.    PHYSICAL EXAM   Blood Pressure 106/78   Pulse 64   Temperature 36.6 °C (97.8 °F)   Height 1.829 m (6')   Weight 114.9 kg (253 lb 4.9 oz)   Oxygen Saturation 96%  Body mass index is 34.35 kg/m².  General:  NAD, well appearing  HEENT:   NC/AT, PERRLA, EOMI, TMs are clear. Oropharyngeal mucosa is pink,  without lesions;  no cervical / supraclavicular  lymphadenopathy, no thyromegaly.    Cardiovascular: RRR.   No m/r/g.       Lungs:   CTAB, no w/r/r, no respiratory distress.  Abdomen: Soft, NT/ND; no hepatosplenomegaly.  Extremities:  2+ DP and radial pulses bilaterally.  No c/c/e.   Skin:  Warm, dry.  No erythema. No rash.   Neurologic: Alert & oriented x 3. CN II-XII grossly intact. No focal deficits.  Psychiatric:  Affect normal, mood normal, judgment normal.    Labs     Labs are reviewed and discussed with a patient  Lab Results   Component Value Date/Time    CHOLSTRLTOT 175 07/19/2019 10:52 AM     (H) 07/19/2019 10:52 AM    HDL 36 (A) 07/19/2019 10:52 AM    TRIGLYCERIDE 152 (H) 07/19/2019 10:52 AM       Lab Results   Component Value Date/Time    SODIUM 137 07/19/2019 10:52 AM    POTASSIUM 4.0 07/19/2019 10:52 AM    CHLORIDE 100 07/19/2019 10:52 AM    CO2 26 07/19/2019 10:52 AM    GLUCOSE 328 (H) 07/19/2019 10:52 AM    BUN 15 07/19/2019 10:52 AM    CREATININE 1.02 07/19/2019 10:52 AM     Lab Results   Component Value Date/Time    ALKPHOSPHAT 95 07/19/2019 10:52 AM    ASTSGOT 57 (H) 07/19/2019 10:52 AM    ALTSGPT 94 (H) 07/19/2019 10:52 AM    TBILIRUBIN 0.8 07/19/2019 10:52 AM      Lab Results   Component Value Date/Time    HBA1C 5.6 10/08/2019 03:20 PM    HBA1C 12.7 (H) 07/19/2019 10:52 AM    HBA1C 5.9 (H) 08/22/2017 11:52 AM     Lab Results   Component Value Date/Time    WBC 6.9 08/22/2017 11:52 AM    RBC 6.05 08/22/2017 11:52 AM    HEMOGLOBIN 18.1 (H) 08/22/2017 11:52 AM    HEMATOCRIT 51.9  08/22/2017 11:52 AM    MCV 85.8 08/22/2017 11:52 AM    MCH 29.9 08/22/2017 11:52 AM    MCHC 34.9 08/22/2017 11:52 AM    MPV 10.8 08/22/2017 11:52 AM    NEUTSPOLYS 66.60 08/22/2017 11:52 AM    LYMPHOCYTES 22.50 08/22/2017 11:52 AM    MONOCYTES 7.30 08/22/2017 11:52 AM    EOSINOPHILS 2.60 08/22/2017 11:52 AM    BASOPHILS 0.60 08/22/2017 11:52 AM        Imaging     None    Assessment and Plan     Ross Ryder is a 44 y.o. male    1. New onset type 2 diabetes mellitus (HCC)    Discussed and educated on:   - All medications, side effects and compliance (discussed carefully)  - Annual eye examinations at Ophthalmology  - Diabetic Meal Plan: foods that contain carbs and plate method  - Factors Affecting Blood Glucose Control: food, illness, medication and stress  - Foot Care: what to look for when checking feet every day and when to contact HCP  - HbA1C: target and what A1C is  - Home glucose monitoring emphasized  - Interpretation of Lab Results  - Long term diabetic complications  - Reminded pt to bring in BS diary at next visit  - Testing Blood Glucose: when to test, recording blood sugars, target range for FSBS and when to contact HCP  - Weight control and daily exercise     Recommended medication changes: no change. When prescription vitamin D complete.    - POCT Hemoglobin A1C  - REFERRAL TO DIABETIC EDUCATION Diabetes Self Management Education / Training (DSME/T) and Medical Nutrition Therapy (MNT): Initial Group DSME/MNT as authorized by payor, Follow-Up DSME/MNT as authorized by payor; DSME/T Content: Monitoring Diabete...  - Comp Metabolic Panel; Future  - HEMOGLOBIN A1C; Future  - MICROALBUMIN CREAT RATIO URINE; Future  - metformin (GLUCOPHAGE) 1000 MG tablet; Take 1 Tab by mouth 2 times a day, with meals.  Dispense: 180 Tab; Refill: 0    2. Dyslipidemia  Discussed treatment options;  He would like to try lifestyle modification, with low travis diet, daily exercise, WT loss  - Comp Metabolic Panel; Future  -  Lipid Profile; Future    3. Transaminitis  Mild, f/u labs  - Comp Metabolic Panel; Future  - US-RUQ; Future  - GAMMA GT (GGT); Future    4. Obesity (BMI 30-39.9)  As above    5. Hypovitaminosis D  Start:  - vitamin D, Ergocalciferol, (DRISDOL) 76910 units Cap capsule; Take 1 Cap by mouth every 7 days.  Dispense: 12 Cap; Refill: 0    6. Healthcare maintenance  7. Need for vaccination  - Pneumococal Polysaccharide Vaccine 23-Valent =>1YO SQ/IM  - Tdap Vaccine =>6YO IM  Information was provided to the patient regarding the vaccine, including side effects. Vaccine was given by my medical assistant under my supervision.    Counseling:   - Smoking:  Nonsmoker    Followup: in 4 months, with labs    All questions are answered.    Time spent 40 minutes face to face, with > 50% spent counseling and coordinating care.  With DM RN.    Please note that this dictation was created using voice recognition software, and that there might be errors of bryan and possibly content.

## 2019-10-17 ENCOUNTER — OFFICE VISIT (OUTPATIENT)
Dept: PHYSICAL MEDICINE AND REHAB | Facility: MEDICAL CENTER | Age: 44
End: 2019-10-17
Payer: COMMERCIAL

## 2019-10-17 VITALS
BODY MASS INDEX: 34.13 KG/M2 | DIASTOLIC BLOOD PRESSURE: 64 MMHG | SYSTOLIC BLOOD PRESSURE: 108 MMHG | TEMPERATURE: 97.7 F | HEIGHT: 72 IN | HEART RATE: 75 BPM | WEIGHT: 251.99 LBS

## 2019-10-17 DIAGNOSIS — M47.816 LUMBAR SPONDYLOSIS: ICD-10-CM

## 2019-10-17 DIAGNOSIS — E11.9 NEW ONSET TYPE 2 DIABETES MELLITUS (HCC): ICD-10-CM

## 2019-10-17 DIAGNOSIS — M54.16 LUMBAR RADICULOPATHY: ICD-10-CM

## 2019-10-17 DIAGNOSIS — M51.26 LUMBAR DISC HERNIATION: ICD-10-CM

## 2019-10-17 DIAGNOSIS — E55.9 HYPOVITAMINOSIS D: ICD-10-CM

## 2019-10-17 PROCEDURE — 99214 OFFICE O/P EST MOD 30 MIN: CPT | Performed by: PHYSICAL MEDICINE & REHABILITATION

## 2019-10-17 ASSESSMENT — PATIENT HEALTH QUESTIONNAIRE - PHQ9
2. FEELING DOWN, DEPRESSED, IRRITABLE, OR HOPELESS: 0
3. TROUBLE FALLING OR STAYING ASLEEP OR SLEEPING TOO MUCH: 1
4. FEELING TIRED OR HAVING LITTLE ENERGY: 1
6. FEELING BAD ABOUT YOURSELF - OR THAT YOU ARE A FAILURE OR HAVE LET YOURSELF OR YOUR FAMILY DOWN: 0
SUM OF ALL RESPONSES TO PHQ QUESTIONS 1-9: 2
7. TROUBLE CONCENTRATING ON THINGS, SUCH AS READING THE NEWSPAPER OR WATCHING TELEVISION: 0
8. MOVING OR SPEAKING SO SLOWLY THAT OTHER PEOPLE COULD HAVE NOTICED. OR THE OPPOSITE, BEING SO FIGETY OR RESTLESS THAT YOU HAVE BEEN MOVING AROUND A LOT MORE THAN USUAL: 0
1. LITTLE INTEREST OR PLEASURE IN DOING THINGS: 0
SUM OF ALL RESPONSES TO PHQ9 QUESTIONS 1 AND 2: 0
9. THOUGHTS THAT YOU WOULD BE BETTER OFF DEAD, OR OF HURTING YOURSELF: 0
5. POOR APPETITE OR OVEREATING: 0

## 2019-10-17 ASSESSMENT — PAIN SCALES - GENERAL: PAINLEVEL: 7=MODERATE-SEVERE PAIN

## 2019-10-17 NOTE — PROGRESS NOTES
follow up patient note  Interventional spine and sports physiatry, Physical medicine rehabilitation      Chief complaint:   Chief Complaint   Patient presents with   • Follow-Up     Back pain         HISTORY    Please see new patient note by Dr Neff,  for more details.     HPI  Patient identification: Ross Ryder 44 y.o. male  With Diagnoses of Lumbar spondylosis, Lumbar disc herniation, Lumbar radiculopathy, New onset type 2 diabetes mellitus (HCC), and Hypovitaminosis D were pertinent to this visit.       - chronic hip and low back pain. Slightly worsened when compared to the previous visit. Both are stable in the morning and worsen throughout the day. Dull aching in quality, 6/10 intensity, sometimes radiates down the left leg now is usually posterior lateral rather than anterior as previously, worse with standing, improves with laying down. Improves with NSAIDs. Also with lateral hip pain described similarly.     Anterior hip pain improving with physical therapy and now that has resolved.        ROS Red Flags :   Fever, Chills, Sweats: Denies  Involuntary Weight Loss: Denies  Bowel/Bladder Incontinence: Denies  Saddle Anesthesia: Denies        PMHx:   Past Medical History:   Diagnosis Date   • IFG (impaired fasting glucose)    • Obesity        PSHx:   History reviewed. No pertinent surgical history.    Family history   Family History   Problem Relation Age of Onset   • Cancer Maternal Aunt         anal   • Diabetes Father    • Diabetes Paternal Grandfather    • Allergies Mother    • Allergies Sister    • Diabetes Sister    • Heart Disease Neg Hx    • Hypertension Neg Hx    • Hyperlipidemia Neg Hx    • Psychiatric Illness Neg Hx    • Stroke Neg Hx    • Thyroid Neg Hx          Medications:   Outpatient Medications Marked as Taking for the 10/17/19 encounter (Office Visit) with Edmund Neff M.D.   Medication Sig Dispense Refill   • vitamin D, Ergocalciferol, (DRISDOL) 98390 units Cap capsule Take 1 Cap  by mouth every 7 days. 12 Cap 0   • metformin (GLUCOPHAGE) 1000 MG tablet Take 1 Tab by mouth 2 times a day, with meals. 180 Tab 0   • Ibuprofen (MOTRIN PO) Take  by mouth.          Current Outpatient Medications on File Prior to Visit   Medication Sig Dispense Refill   • vitamin D, Ergocalciferol, (DRISDOL) 91660 units Cap capsule Take 1 Cap by mouth every 7 days. 12 Cap 0   • metformin (GLUCOPHAGE) 1000 MG tablet Take 1 Tab by mouth 2 times a day, with meals. 180 Tab 0   • Ibuprofen (MOTRIN PO) Take  by mouth.       No current facility-administered medications on file prior to visit.          Allergies:   Allergies   Allergen Reactions   • Penicillin G        Social Hx:   Social History     Socioeconomic History   • Marital status: Single     Spouse name: Not on file   • Number of children: Not on file   • Years of education: Not on file   • Highest education level: Not on file   Occupational History   • Not on file   Social Needs   • Financial resource strain: Not on file   • Food insecurity:     Worry: Not on file     Inability: Not on file   • Transportation needs:     Medical: Not on file     Non-medical: Not on file   Tobacco Use   • Smoking status: Never Smoker   • Smokeless tobacco: Never Used   Substance and Sexual Activity   • Alcohol use: No     Alcohol/week: 0.0 oz     Comment: social   • Drug use: No   • Sexual activity: Not on file   Lifestyle   • Physical activity:     Days per week: Not on file     Minutes per session: Not on file   • Stress: Not on file   Relationships   • Social connections:     Talks on phone: Not on file     Gets together: Not on file     Attends [employer] service: Not on file     Active member of club or organization: Not on file     Attends meetings of clubs or organizations: Not on file     Relationship status: Not on file   • Intimate partner violence:     Fear of current or ex partner: Not on file     Emotionally abused: Not on file     Physically abused: Not on file      Forced sexual activity: Not on file   Other Topics Concern   •  Service No   • Blood Transfusions No   • Caffeine Concern No   • Occupational Exposure No   • Hobby Hazards No   • Sleep Concern No   • Stress Concern No   • Weight Concern No   • Special Diet No   • Back Care No   • Exercise Yes   • Bike Helmet No   • Seat Belt Yes   • Self-Exams Yes   Social History Narrative   • Not on file         EXAMINATION     Physical Exam:   Vitals: /64 (BP Location: Left arm, Patient Position: Sitting, BP Cuff Size: Adult)   Pulse 75   Temp 36.5 °C (97.7 °F) (Temporal)   Ht 1.829 m (6')   Wt 114.3 kg (251 lb 15.8 oz)     Constitutional:   Body Habitus: Body mass index is 34.18 kg/m².  Cooperation: Fully cooperates with exam  Appearance: Well-groomed no disheveled      Respiratory-  breathing comfortable on room air, no audible wheezing  Cardiovascular- capillary refills less than 2 seconds. No lower extremity edema is noted.   Psychiatric- alert and oriented ×3. Normal affect.    MSK: -   Key points for the international standards for neurological classification of spinal cord injury (ISNCSCI) to light touch.       Key points for the international standards for neurological classification of spinal cord injury (ISNCSCI) to light touch.      Dermatome R L                                      L2 2 2   L3 2 2   L4 2 2   L5 2 2   S1 2 2   S2 2 2         Motor Exam Lower Extremities    ? Myotome R L   Hip flexion L2 5 5   Knee extension L3 5 5   Ankle dorsiflexion L4 5 5   Toe extension L5 5 5   Ankle plantarflexion S1 5 5              MEDICAL DECISION MAKING    DATA    Labs:    Lab Results   Component Value Date/Time    SODIUM 137 07/19/2019 10:52 AM    POTASSIUM 4.0 07/19/2019 10:52 AM    CHLORIDE 100 07/19/2019 10:52 AM    CO2 26 07/19/2019 10:52 AM    GLUCOSE 328 (H) 07/19/2019 10:52 AM    BUN 15 07/19/2019 10:52 AM    CREATININE 1.02 07/19/2019 10:52 AM        No results found for: PROTHROMBTM, INR     Lab  Results   Component Value Date/Time    WBC 6.9 08/22/2017 11:52 AM    RBC 6.05 08/22/2017 11:52 AM    HEMOGLOBIN 18.1 (H) 08/22/2017 11:52 AM    HEMATOCRIT 51.9 08/22/2017 11:52 AM    MCV 85.8 08/22/2017 11:52 AM    MCH 29.9 08/22/2017 11:52 AM    MCHC 34.9 08/22/2017 11:52 AM    MPV 10.8 08/22/2017 11:52 AM    NEUTSPOLYS 66.60 08/22/2017 11:52 AM    LYMPHOCYTES 22.50 08/22/2017 11:52 AM    MONOCYTES 7.30 08/22/2017 11:52 AM    EOSINOPHILS 2.60 08/22/2017 11:52 AM    BASOPHILS 0.60 08/22/2017 11:52 AM        Lab Results   Component Value Date/Time    HBA1C 5.6 10/08/2019 03:20 PM          Imaging:   I personally reviewed following images    MRI lumbar spine 10/4/2019  Congenitally short pedicles, congenitally small neuroforamen.  Moderate left L4-5 neuroforaminal stenosis.  Mild to moderate right L4-5 neuroforaminal stenosis.  At least mild neuroforaminal stenosis bilaterally L5-S1 with a small high intensity zone of the L5 disc which may represent an annular tear best seen on series 301 image 6.  Facet arthropathy worst bilaterally L4-5 and L5-S1.    I reviewed the following radiology reports                                        Results for orders placed during the hospital encounter of 10/04/19   MR-LUMBAR SPINE-W/O    Impression 1.  Multilevel multifactorial degenerative changes with relatively prominent facet arthropathy at L4-L5 bilaterally  2.  No areas of high-grade central canal narrowing  3.  Areas of neural foraminal narrowing as described above                                            Results for orders placed in visit on 03/04/19   DX-FOOT-COMPLETE 3+ RIGHT    Impression No acute fracture identified.                                                                     DIAGNOSIS   Visit Diagnoses     ICD-10-CM   1. Lumbar spondylosis M47.816   2. Lumbar disc herniation M51.26   3. Lumbar radiculopathy M54.16   4. New onset type 2 diabetes mellitus (HCC) E11.9   5. Hypovitaminosis D E55.9          ASSESSMENT and PLAN:     Ross Ryder 44 y.o. male      Ross was seen today for follow-up.    Diagnoses and all orders for this visit:    Lumbar spondylosis    Lumbar disc herniation    Lumbar radiculopathy  -     REFERRAL TO PHYSICIAL MEDICINE REHAB    New onset type 2 diabetes mellitus (HCC)    Hypovitaminosis D      Stop meloxicam and NSAIDs 5 days prior to the procedure below.  Okay to resume NSAIDs.    The patient has had left low back pain rating down the left leg posterior lateral fashion just for a left L4 and L5 radiculopathy who is failed conservative treatments of medication management physical therapy.  I ordered a left L4-5 and L5-S1 transforaminal epidural steroid injection    The risks benefits and alternatives to this procedure were discussed and the patient wishes to proceed with the procedure. Risks include but are not limited to damage to surrounding structures, infection, bleeding, worsening of pain which can be permanent, weakness which can be permanent. Benefits include pain relief, improved function. Alternatives includes not doing the procedure.      We also discussed that his lumbar spondylosis is likely contributing to the if he fails the above intervention but I would consider diagnostic medial branch blocks.      The patient most recent A1c was 5.6.  He should tolerate a steroid injection.        Follow up: 2 weeks after the procedure    Thank you for allowing me to participate in the care of this patient. If you have any questions please not hesitate to contact me.          Please note that this dictation was created using voice recognition software. I have made every reasonable attempt to correct obvious errors but there may be errors of grammar and content that I may have overlooked prior to finalization of this note.      Edmund Neff MD  Interventional Spine and Sports Physiatry  Physical Medicine and Rehabilitation  [facility]

## 2019-10-17 NOTE — PATIENT INSTRUCTIONS
Your procedure will be at the [facility] special procedure suite.    [facility] [place] [address]       PRE-PROCEDURE INSTRUCTIONS  You may take your regular medications except:   · No Anti-inflammatories 5 days prior to your procedure. Anti-inflammatories include medicines such as  ibuprofen (Motrin, Advil), Excedrin, Naproxen (Aleve, Anaprox, Naprelan, Naprosyn), Celecoxib (Celebrex), Diclofenac (Voltaren-XR tab), and Meloxicam (Mobic).   · No Glucophage or Metformin 24 hours before your procedure. You may resume next day after your procedure.  · Call the physiatry office if you are taking or prescribed anti-biotics within five days of procedure.  · Please ask provider if you are taking any new diabetes medication.  · CONTINUE TAKING BLOOD PRESSURE MEDICATIONS AS PRESCRIBED.  · Pain medications will not be prescribed on the procedure day. Procedural pain medication may be used by your provider   · Call your doctor's office performing the procedure if you have a fever, chills, rash or new illness prior to your procedure    Anticoagulation/antiplatelet medications  No Blood thinning medications such as Coumadin or Plavix 5 days prior to procedure unless your doctor said to continue these medications. Call your doctor if a new medication is prescribed in this class.     Restrictions for eating before procedure:   · If you are getting procedural sedation, then do not eat to for 8 hours prior to procedure appointment time. Do not drink fluids for four hours prior to your procedure time.   · If you are not having procedural sedation, then Skip the meal prior to your procedure. If you have a morning procedure then skip breakfast. If you have an afternoon procedure then skip lunch.   · You may drink clear liquids up to 2 hours prior to your procedure  · You must have a  the day of procedure to accompany you home.      POST PROCEDURE INSTRUCTIONS   · No heavy lifting, strenuous bending or  strenuous exercise for 3 days after your procedure.  · No hot tubs, baths, swimming for 3 days after your procedure  · You can remove the bandage the day after the procedure.  · IF YOU RECEIVED A STEROID INJECTION. PLEASE NOTE THAT THERE MAY BE A DELAY FOR THE INJECTION TO START WORKING, THE DELAY MAY BE UP TO TWO WEEKS. IF YOU HAVE DIABETES, PLEASE NOTE THAT YOUR SUGAR LEVELS MAY BE ELEVATED FOR 1-2 DAYS AFTER A STEROID INJECTION.  · IF YOU EXPERIENCE PROLONGED WEAKNESS LONGER THAN ONE DAY, BOWEL OR BLADDER INCONTINENCE THEN PLEASE CALL THE PHYSIATRY OFFICE.  · Your leg may feel heavy, weak and numb for up to 1-2 days. Be very careful walking.   ·  You may resume normal activities 3 days after procedure.

## 2019-11-04 ENCOUNTER — HOSPITAL ENCOUNTER (OUTPATIENT)
Dept: RADIOLOGY | Facility: REHABILITATION | Age: 44
End: 2019-11-04
Attending: PHYSICAL MEDICINE & REHABILITATION

## 2019-11-04 ENCOUNTER — HOSPITAL ENCOUNTER (OUTPATIENT)
Dept: PAIN MANAGEMENT | Facility: REHABILITATION | Age: 44
End: 2019-11-04
Attending: PHYSICAL MEDICINE & REHABILITATION
Payer: COMMERCIAL

## 2019-11-04 VITALS
HEART RATE: 62 BPM | BODY MASS INDEX: 33.65 KG/M2 | HEIGHT: 72 IN | DIASTOLIC BLOOD PRESSURE: 88 MMHG | SYSTOLIC BLOOD PRESSURE: 126 MMHG | WEIGHT: 248.46 LBS | TEMPERATURE: 98 F | OXYGEN SATURATION: 94 % | RESPIRATION RATE: 16 BRPM

## 2019-11-04 PROCEDURE — 64483 NJX AA&/STRD TFRM EPI L/S 1: CPT

## 2019-11-04 PROCEDURE — 700111 HCHG RX REV CODE 636 W/ 250 OVERRIDE (IP)

## 2019-11-04 PROCEDURE — 700117 HCHG RX CONTRAST REV CODE 255

## 2019-11-04 PROCEDURE — 64484 NJX AA&/STRD TFRM EPI L/S EA: CPT

## 2019-11-04 RX ORDER — DEXAMETHASONE SODIUM PHOSPHATE 10 MG/ML
INJECTION, SOLUTION INTRAMUSCULAR; INTRAVENOUS
Status: COMPLETED
Start: 2019-11-04 | End: 2019-11-04

## 2019-11-04 RX ORDER — LIDOCAINE HYDROCHLORIDE 10 MG/ML
INJECTION, SOLUTION EPIDURAL; INFILTRATION; INTRACAUDAL; PERINEURAL
Status: COMPLETED
Start: 2019-11-04 | End: 2019-11-04

## 2019-11-04 RX ADMIN — LIDOCAINE HYDROCHLORIDE 10 ML: 10 INJECTION, SOLUTION EPIDURAL; INFILTRATION; INTRACAUDAL; PERINEURAL at 15:53

## 2019-11-04 RX ADMIN — DEXAMETHASONE SODIUM PHOSPHATE 20 MG: 10 INJECTION, SOLUTION INTRAMUSCULAR; INTRAVENOUS at 15:56

## 2019-11-04 RX ADMIN — IOHEXOL 2 ML: 240 INJECTION, SOLUTION INTRATHECAL; INTRAVASCULAR; INTRAVENOUS; ORAL at 15:55

## 2019-11-04 NOTE — NON-PROVIDER
Patient identified, medication allergies,procedure confirmed,  pertinent medical history .Site marked by Dr. Neff. Positioned patient by RN,ST, & X- ray Tech.Feet placed pillow for support.

## 2019-11-04 NOTE — NON-PROVIDER
Current medications reviewed with pt, see medications reconciliation form. Pt israel taking ASA or other blood thinners or anti-inflammatories.  Pt has a ride post-procedure(Zenia to drive).  Printed and verbal discharge instructions given to pt who verbalized understanding.

## 2019-11-05 NOTE — PROCEDURES
Date of Service: 11/4/2019     Patient: Ross Ryder 44 y.o. male     MRN: [id number]     Physician/s: Edmund Neff MD    Pre-operative Diagnosis: Lumbar radiculopathy    Post-operative Diagnosis: Lumbar radiculopathy    Procedure: left Lumbar Transforaminal Epidural Steroid  at the L4-5 and L5-S1 levels.     Description of procedure:    The risks, benefits, and alternatives of the procedure were reviewed and discussed with the patient.  Written informed consent was freely obtained. A pre-procedural time-out was conducted by the physician verifying patient’s identity, procedure to be performed, procedure site and side, and allergy verification. Appropriate equipment was determined to be in place for the procedure.         The patient's vital signs were carefully monitored before, throughout, and after the procedure.     In the fluoroscopy suite the patient was placed in a prone position, a pillow placed underneath their umbilicus. The skin was prepped and draped in the usual sterile fashion.     The fluoroscope was placed over the lumbar spine and adjusted into the proper AP/Oblique view to enter the transforaminal space just adjacent to the pedicle at the levels below. The targets for injection were then marked at the left L4-5. A 27g 1.5 inch needle was placed into the marked site, and 1mL of 1% Lidocaine was injected subcutaneously into the epidermal and dermal layers. The needle was removed intact.  A 25g 3.5 inch spinal needle was then placed and advanced under fluoroscopic guidance in an oblique view towards the subpedicular epidural space of the levels noted above. The needle position was confirmed to not be past the 6 o'clock position in the AP view and it was in the neuroforamen in the lateral view.        The fluoroscope was was then adjusted over the lumbar spine and adjusted into the proper AP/Oblique view to enter the transforaminal space adjacent to the pedicle at the LEFT  L5-S1. A 27g 1.5 inch  needle was placed into the marked site, and 1mL of 1% Lidocaine was injected subcutaneously into the epidermal and dermal layers. The needle was removed intact.  A 25g 3.5 inch spinal needle was then placed and advanced under fluoroscopic guidance in an oblique view towards the subpedicular epidural space of the levels noted above. The needle position was confirmed to not be past the 6 o'clock position in the AP view and it was in the neuroforamen in the lateral view.       Under live fluoroscopic guidance in the AP view, contrast dye was used to highlight the epidural space spread of each level above. Final fluoroscopic images were saved.  Following negative aspiration, 1mL of 1% lidocaine preservative free with 10 mg of dexamethasone was then injected at each level above, and the needles were removed intact after restyleted. The patient's back was covered with a 4x4 gauze, the area was cleansed with sterile normal saline, and a dressing was applied. There were no complications noted.     The patient was then evaluated post-procedure, and was hemodynamically stable prior to leaving the post-operative care unit.     A follow-up visit in clinic as scheduled for 11/20/2019     Edmund Neff MD  Physical Medicine and Rehabilitation  Interventional Spine and Sports Physiatry  [facility]          CPT codes  Transforaminal epidural injection- lumbar or sacral (first level):  27433  Transforaminal epidural injection- lumbar or sacral (each additional level):  35336

## 2019-11-06 ENCOUNTER — TELEPHONE (OUTPATIENT)
Dept: PHYSICAL MEDICINE AND REHAB | Facility: MEDICAL CENTER | Age: 44
End: 2019-11-06

## 2019-11-07 NOTE — TELEPHONE ENCOUNTER
SILVER to call us back in regards to his SP that was done with Dr. Neff dated 11/4/19 for his left L4-5 and L5-S1 transforaminal epidural steroid injection.    Thank you  Nicky

## 2019-11-21 ENCOUNTER — OFFICE VISIT (OUTPATIENT)
Dept: PHYSICAL MEDICINE AND REHAB | Facility: MEDICAL CENTER | Age: 44
End: 2019-11-21
Payer: COMMERCIAL

## 2019-11-21 VITALS
DIASTOLIC BLOOD PRESSURE: 64 MMHG | HEIGHT: 72 IN | HEART RATE: 75 BPM | BODY MASS INDEX: 34.4 KG/M2 | OXYGEN SATURATION: 98 % | TEMPERATURE: 97.7 F | SYSTOLIC BLOOD PRESSURE: 102 MMHG | WEIGHT: 253.97 LBS

## 2019-11-21 DIAGNOSIS — M47.816 LUMBAR SPONDYLOSIS: ICD-10-CM

## 2019-11-21 DIAGNOSIS — M54.16 LUMBAR RADICULOPATHY: ICD-10-CM

## 2019-11-21 DIAGNOSIS — M51.26 LUMBAR DISC HERNIATION: ICD-10-CM

## 2019-11-21 PROCEDURE — 99214 OFFICE O/P EST MOD 30 MIN: CPT | Performed by: PHYSICAL MEDICINE & REHABILITATION

## 2019-11-21 ASSESSMENT — PATIENT HEALTH QUESTIONNAIRE - PHQ9: CLINICAL INTERPRETATION OF PHQ2 SCORE: 0

## 2019-11-21 ASSESSMENT — PAIN SCALES - GENERAL: PAINLEVEL: NO PAIN

## 2019-11-21 NOTE — PROGRESS NOTES
follow up patient note  Interventional spine and sports physiatry, Physical medicine rehabilitation      Chief complaint:   Chief Complaint   Patient presents with   • Back Pain         HISTORY    Please see new patient note by Dr Neff,  for more details.     HPI  Patient identification: Ross Ryder 44 y.o. male  With Diagnoses of Lumbar spondylosis, Lumbar radiculopathy, and Lumbar disc herniation were pertinent to this visit.     S/p epidural two weeks ago. Patient had complete resolution of pain after the procedure. Intermittent, dull aching 1/10 pain, nonradiating. But this is tolerable. Sleeping improved.        ROS Red Flags :   Fever, Chills, Sweats: Denies  Involuntary Weight Loss: Denies  Bowel/Bladder Incontinence: Denies  Saddle Anesthesia: Denies        PMHx:   Past Medical History:   Diagnosis Date   • IFG (impaired fasting glucose)    • Obesity        PSHx:   History reviewed. No pertinent surgical history.    Family history   Family History   Problem Relation Age of Onset   • Cancer Maternal Aunt         anal   • Diabetes Father    • Diabetes Paternal Grandfather    • Allergies Mother    • Allergies Sister    • Diabetes Sister    • Heart Disease Neg Hx    • Hypertension Neg Hx    • Hyperlipidemia Neg Hx    • Psychiatric Illness Neg Hx    • Stroke Neg Hx    • Thyroid Neg Hx          Medications:   Outpatient Medications Marked as Taking for the 11/21/19 encounter (Office Visit) with Edmund Neff M.D.   Medication Sig Dispense Refill   • vitamin D, Ergocalciferol, (DRISDOL) 58952 units Cap capsule Take 1 Cap by mouth every 7 days. 12 Cap 0   • metformin (GLUCOPHAGE) 1000 MG tablet Take 1 Tab by mouth 2 times a day, with meals. 180 Tab 0   • Ibuprofen (MOTRIN PO) Take  by mouth.          Current Outpatient Medications on File Prior to Visit   Medication Sig Dispense Refill   • vitamin D, Ergocalciferol, (DRISDOL) 51079 units Cap capsule Take 1 Cap by mouth every 7 days. 12 Cap 0   • metformin  (GLUCOPHAGE) 1000 MG tablet Take 1 Tab by mouth 2 times a day, with meals. 180 Tab 0   • Ibuprofen (MOTRIN PO) Take  by mouth.       No current facility-administered medications on file prior to visit.          Allergies:   Allergies   Allergen Reactions   • Penicillin G        Social Hx:   Social History     Socioeconomic History   • Marital status: Single     Spouse name: Not on file   • Number of children: Not on file   • Years of education: Not on file   • Highest education level: Not on file   Occupational History   • Not on file   Social Needs   • Financial resource strain: Not on file   • Food insecurity:     Worry: Not on file     Inability: Not on file   • Transportation needs:     Medical: Not on file     Non-medical: Not on file   Tobacco Use   • Smoking status: Never Smoker   • Smokeless tobacco: Never Used   Substance and Sexual Activity   • Alcohol use: No     Alcohol/week: 0.0 oz     Comment: social   • Drug use: No   • Sexual activity: Not on file   Lifestyle   • Physical activity:     Days per week: Not on file     Minutes per session: Not on file   • Stress: Not on file   Relationships   • Social connections:     Talks on phone: Not on file     Gets together: Not on file     Attends Shinto service: Not on file     Active member of club or organization: Not on file     Attends meetings of clubs or organizations: Not on file     Relationship status: Not on file   • Intimate partner violence:     Fear of current or ex partner: Not on file     Emotionally abused: Not on file     Physically abused: Not on file     Forced sexual activity: Not on file   Other Topics Concern   •  Service No   • Blood Transfusions No   • Caffeine Concern No   • Occupational Exposure No   • Hobby Hazards No   • Sleep Concern No   • Stress Concern No   • Weight Concern No   • Special Diet No   • Back Care No   • Exercise Yes   • Bike Helmet No   • Seat Belt Yes   • Self-Exams Yes   Social History Narrative   • Not  on file         EXAMINATION     Physical Exam:   Vitals: /64 (BP Location: Left arm, Patient Position: Sitting, BP Cuff Size: Adult)   Pulse 75   Temp 36.5 °C (97.7 °F) (Temporal)   Ht 1.829 m (6')   Wt 115.2 kg (253 lb 15.5 oz)   SpO2 98%     Constitutional:   Body Habitus: Body mass index is 34.44 kg/m².  Cooperation: Fully cooperates with exam  Appearance: Well-groomed no disheveled      Respiratory-  breathing comfortable on room air, no audible wheezing  Cardiovascular- capillary refills less than 2 seconds. No lower extremity edema is noted.   Psychiatric- alert and oriented ×3. Normal affect.    MSK: -     There are no signs of infection around the injection sites.   full  active range of motion with flexion, lateral flexion, and rotation bilaterally.   There is full  active range of motion with lumbar extension.      Palpation:   No tenderness to palpation in midline at T1-T12 levels. No tenderness to palpation in the left and right of the midline T1-L5, NEGATIVE for tenderness to palpation to the para-midline region in the lower lumbar levels.    Lumbar spine Special tests  Neuro tension  Straight leg test negative bilaterally    Slump test negative bilaterally      Key points for the international standards for neurological classification of spinal cord injury (ISNCSCI) to light touch.     Dermatome R L                                      L2 2 2   L3 2 2   L4 2 2   L5 2 2   S1 2 2   S2 2 2         Motor Exam Lower Extremities    ? Myotome R L   Hip flexion L2 5 5   Knee extension L3 5 5   Ankle dorsiflexion L4 5 5   Toe extension L5 5 5   Ankle plantarflexion S1 5 5             MEDICAL DECISION MAKING    DATA    Labs:    Lab Results   Component Value Date/Time    SODIUM 137 07/19/2019 10:52 AM    POTASSIUM 4.0 07/19/2019 10:52 AM    CHLORIDE 100 07/19/2019 10:52 AM    CO2 26 07/19/2019 10:52 AM    GLUCOSE 328 (H) 07/19/2019 10:52 AM    BUN 15 07/19/2019 10:52 AM    CREATININE 1.02 07/19/2019  10:52 AM        No results found for: PROTHROMBTM, INR     Lab Results   Component Value Date/Time    WBC 6.9 08/22/2017 11:52 AM    RBC 6.05 08/22/2017 11:52 AM    HEMOGLOBIN 18.1 (H) 08/22/2017 11:52 AM    HEMATOCRIT 51.9 08/22/2017 11:52 AM    MCV 85.8 08/22/2017 11:52 AM    MCH 29.9 08/22/2017 11:52 AM    MCHC 34.9 08/22/2017 11:52 AM    MPV 10.8 08/22/2017 11:52 AM    NEUTSPOLYS 66.60 08/22/2017 11:52 AM    LYMPHOCYTES 22.50 08/22/2017 11:52 AM    MONOCYTES 7.30 08/22/2017 11:52 AM    EOSINOPHILS 2.60 08/22/2017 11:52 AM    BASOPHILS 0.60 08/22/2017 11:52 AM        Lab Results   Component Value Date/Time    HBA1C 5.6 10/08/2019 03:20 PM          Imaging:   I personally reviewed following images    MRI lumbar spine 10/4/2019  Congenitally short pedicles, congenitally small neuroforamen.  Moderate left L4-5 neuroforaminal stenosis.  Mild to moderate right L4-5 neuroforaminal stenosis.  At least mild neuroforaminal stenosis bilaterally L5-S1 with a small high intensity zone of the L5 disc which may represent an annular tear best seen on series 301 image 6.  Facet arthropathy worst bilaterally L4-5 and L5-S1.    I reviewed the following radiology reports                                        Results for orders placed during the hospital encounter of 10/04/19   MR-LUMBAR SPINE-W/O    Impression 1.  Multilevel multifactorial degenerative changes with relatively prominent facet arthropathy at L4-L5 bilaterally  2.  No areas of high-grade central canal narrowing  3.  Areas of neural foraminal narrowing as described above                                            Results for orders placed in visit on 03/04/19   DX-FOOT-COMPLETE 3+ RIGHT    Impression No acute fracture identified.                                                                     DIAGNOSIS   Visit Diagnoses     ICD-10-CM   1. Lumbar spondylosis M47.816   2. Lumbar radiculopathy M54.16   3. Lumbar disc herniation M51.26         ASSESSMENT and PLAN:      Ross Ryder 44 y.o. male      Ross was seen today for back pain.    Diagnoses and all orders for this visit:    Lumbar spondylosis  -     REFERRAL TO PHYSICAL THERAPY Reason for Therapy: Eval/Treat/Report    Lumbar radiculopathy  -     REFERRAL TO PHYSICAL THERAPY Reason for Therapy: Eval/Treat/Report    Lumbar disc herniation  -     REFERRAL TO PHYSICAL THERAPY Reason for Therapy: Eval/Treat/Report         Okay to resume meloxicam as needed however I would not want the patient on this medication long-term.  It is okay for him to use ibuprofen as needed if he is not using the Mobic however he would like to keep NSAIDs to a minimum if possible.    The patient had an outstanding response to the transforaminal epidural steroid injection with near complete resolution of his pain, improve function and sleep.  I would think the patient now can tolerate physical therapy and hopefully get more out of this.  We discussed the importance of his home exercise program.     Follow up: With PCP or PRN with me    Thank you for allowing me to participate in the care of this patient. If you have any questions please not hesitate to contact me.          Please note that this dictation was created using voice recognition software. I have made every reasonable attempt to correct obvious errors but there may be errors of grammar and content that I may have overlooked prior to finalization of this note.      Edmund Neff MD  Interventional Spine and Sports Physiatry  Physical Medicine and Rehabilitation  [facility]

## 2019-11-21 NOTE — Clinical Note
Dear Lukasz Borrego M.D. , Thank you for the referral of Ross Ryder.  He is doing great with near complete resolution of his pain and improvement in function.  Please see my note for more details Should you have any questions or concerns please do not hesitate to contact me. Edmund Neff M.D.

## 2020-01-03 ENCOUNTER — APPOINTMENT (OUTPATIENT)
Dept: PHYSICAL THERAPY | Facility: MEDICAL CENTER | Age: 45
End: 2020-01-03
Attending: INTERNAL MEDICINE
Payer: COMMERCIAL

## 2020-01-10 ENCOUNTER — APPOINTMENT (OUTPATIENT)
Dept: PHYSICAL THERAPY | Facility: MEDICAL CENTER | Age: 45
End: 2020-01-10
Attending: INTERNAL MEDICINE
Payer: COMMERCIAL

## 2020-01-15 DIAGNOSIS — E55.9 HYPOVITAMINOSIS D: ICD-10-CM

## 2020-01-15 RX ORDER — ERGOCALCIFEROL 1.25 MG/1
50000 CAPSULE ORAL
Qty: 12 CAP | Refills: 0 | Status: SHIPPED | OUTPATIENT
Start: 2020-01-15 | End: 2021-08-06

## 2020-01-17 ENCOUNTER — APPOINTMENT (OUTPATIENT)
Dept: PHYSICAL THERAPY | Facility: MEDICAL CENTER | Age: 45
End: 2020-01-17
Attending: INTERNAL MEDICINE
Payer: COMMERCIAL

## 2020-01-23 DIAGNOSIS — E11.9 NEW ONSET TYPE 2 DIABETES MELLITUS (HCC): ICD-10-CM

## 2020-01-24 ENCOUNTER — APPOINTMENT (OUTPATIENT)
Dept: PHYSICAL THERAPY | Facility: MEDICAL CENTER | Age: 45
End: 2020-01-24
Attending: INTERNAL MEDICINE
Payer: COMMERCIAL

## 2020-01-24 ENCOUNTER — APPOINTMENT (OUTPATIENT)
Dept: MEDICAL GROUP | Facility: MEDICAL CENTER | Age: 45
End: 2020-01-24
Payer: COMMERCIAL

## 2020-01-31 ENCOUNTER — APPOINTMENT (OUTPATIENT)
Dept: PHYSICAL THERAPY | Facility: MEDICAL CENTER | Age: 45
End: 2020-01-31
Attending: INTERNAL MEDICINE
Payer: COMMERCIAL

## 2020-02-20 DIAGNOSIS — E11.9 NEW ONSET TYPE 2 DIABETES MELLITUS (HCC): ICD-10-CM

## 2020-03-11 DIAGNOSIS — E11.9 NEW ONSET TYPE 2 DIABETES MELLITUS (HCC): ICD-10-CM

## 2020-04-07 ENCOUNTER — EH NON-PROVIDER (OUTPATIENT)
Dept: OCCUPATIONAL MEDICINE | Facility: CLINIC | Age: 45
End: 2020-04-07

## 2020-05-22 DIAGNOSIS — E11.9 NEW ONSET TYPE 2 DIABETES MELLITUS (HCC): ICD-10-CM

## 2020-07-07 ENCOUNTER — TELEPHONE (OUTPATIENT)
Dept: MEDICAL GROUP | Age: 45
End: 2020-07-07

## 2020-07-07 NOTE — TELEPHONE ENCOUNTER
Phone Number Called: [phone] (home)       Call outcome: Did not leave a detailed message. Requested patient to call back.    Message: LVM for patient to call back to schedule appointment so he can have his Metformin medication refilled.

## 2020-07-22 ENCOUNTER — OFFICE VISIT (OUTPATIENT)
Dept: MEDICAL GROUP | Facility: MEDICAL CENTER | Age: 45
End: 2020-07-22
Payer: COMMERCIAL

## 2020-07-22 VITALS
BODY MASS INDEX: 36.4 KG/M2 | HEIGHT: 71 IN | DIASTOLIC BLOOD PRESSURE: 72 MMHG | SYSTOLIC BLOOD PRESSURE: 120 MMHG | OXYGEN SATURATION: 98 % | HEART RATE: 64 BPM | WEIGHT: 260 LBS | RESPIRATION RATE: 16 BRPM | TEMPERATURE: 97.6 F

## 2020-07-22 DIAGNOSIS — E78.5 DYSLIPIDEMIA: ICD-10-CM

## 2020-07-22 DIAGNOSIS — M51.37 DDD (DEGENERATIVE DISC DISEASE), LUMBOSACRAL: ICD-10-CM

## 2020-07-22 DIAGNOSIS — R22.9 SUBCUTANEOUS MASS: ICD-10-CM

## 2020-07-22 DIAGNOSIS — Z13.29 THYROID DISORDER SCREEN: ICD-10-CM

## 2020-07-22 DIAGNOSIS — E55.9 HYPOVITAMINOSIS D: ICD-10-CM

## 2020-07-22 DIAGNOSIS — H91.93 DECREASED HEARING, BILATERAL: ICD-10-CM

## 2020-07-22 DIAGNOSIS — E66.9 OBESITY (BMI 30-39.9): ICD-10-CM

## 2020-07-22 DIAGNOSIS — E11.9 NEW ONSET TYPE 2 DIABETES MELLITUS (HCC): ICD-10-CM

## 2020-07-22 DIAGNOSIS — R74.01 TRANSAMINITIS: ICD-10-CM

## 2020-07-22 PROCEDURE — 99214 OFFICE O/P EST MOD 30 MIN: CPT | Performed by: NURSE PRACTITIONER

## 2020-07-22 NOTE — ASSESSMENT & PLAN NOTE
History of mildly elevated AST and ALT. Reports having had right upper quadrant ultrasound of the record which showed fatty liver.  He rarely drinks alcohol.

## 2020-07-22 NOTE — PROGRESS NOTES
Subjective:     Chief Complaint   Patient presents with   • Establish Care     NP, diabetic F/V, cysts on back, check hearing     Ross Miguel A Ryder is a 45 y.o. male here to transfer care from Dr. Sosa.  He has 3 children, the youngest a senior in high school.  Lives with long-term girlfriend.  He works as an IR tech at the \[employer]\""    New onset type 2 diabetes mellitus (HCC)  Diagnosed last year with a1c 12.7  Was able to get down to 5.6 with diet change and weight loss but now has not been doing as well in recent months, has had some glucose readings at home up to 300.  He has regained the weight.  He was previously taking metformin 1000 mg twice daily but is currently out of medication.  No polyuria, polydipsia, numbness or tingling in extremities.  He does occasionally have blurred vision, he is due for an eye exam and planning to schedule this    DDD (degenerative disc disease), lumbosacral  Reports history of chronic back pain and intermittent flares of sciatica.  Injections done by Dr. Neff in the past which have helped.  Rarely needing to take flexeril at this point.    Obesity (BMI 30-39.9)  Patient states that his weight is fluctuated over the last few years, he has had success with weight loss when working on diet and exercise but is currently out of his routine    Hypovitaminosis D  Previously on 50,000 international units vitamin D3 weekly, currently out.  Will reevaluate labs    Decreased hearing, bilateral  Notes decreased hearing bilaterally which is worsening over the last few years.  No history of ear trauma.  He did work around loud machinery when younger, he would like hearing eval    Transaminitis  History of mildly elevated AST and ALT. Reports having had right upper quadrant ultrasound of the record which showed fatty liver.  He rarely drinks alcohol.    Subcutaneous mass  2 subcutaneous growths causing him trouble-1 in the right mid back and then a smaller one in the right low back.  He is  "interested in having the larger of these removed as it does cause him discomfort when lying down on his back.  No acute pain, no drainage       Current medicines (including changes today)  Current Outpatient Medications   Medication Sig Dispense Refill   • metformin (GLUCOPHAGE) 1000 MG tablet Take 1 Tab by mouth 2 times a day, with meals. 180 Tab 1   • vitamin D, Ergocalciferol, (DRISDOL) 95686 units Cap capsule Take 1 Cap by mouth every 7 days. 12 Cap 0   • Ibuprofen (MOTRIN PO) Take  by mouth.       No current facility-administered medications for this visit.      He  has a past medical history of IFG (impaired fasting glucose) and Obesity.    ROS included above     Objective:     /72 (BP Location: Right arm, Patient Position: Sitting, BP Cuff Size: Large adult)   Pulse 64   Temp 36.4 °C (97.6 °F) (Temporal)   Resp 16   Ht 1.803 m (5' 11\")   Wt 117.9 kg (260 lb)   SpO2 98%  Body mass index is 36.26 kg/m².     Physical Exam:  General: Alert, oriented in no acute distress.  Eye contact is good, speech is normal, affect calm  HEENT: Oral mucosa pink moist, no lesions. TMs gray with good landmarks bilaterally. No lymphadenopathy.  Lungs: clear to auscultation bilaterally, normal effort, no wheeze/ rhonchi/ rales.  CV: regular rate and rhythm, S1, S2, no murmur  Abdomen: soft, nontender  Ext: Right mid back with approximately 4 cm mobile mildly tender growth, smaller mobile growth in the right low back.  No edema, color normal, vascularity normal, temperature normal    Assessment and Plan:   The following treatment plan was discussed   1. New onset type 2 diabetes mellitus (HCC)  Initially had done very well reducing his A1c down to 5.6 but now states that he has not been as good about his diet or exercise, he has gained weight again and is concerned that it may be increasing.  Reevaluate labs, continue metformin.  Ozempic discussed, I will follow-up with him pending lab results  - HEMOGLOBIN A1C; " Future  - Comp Metabolic Panel; Future  - Lipid Profile; Future  - metformin (GLUCOPHAGE) 1000 MG tablet; Take 1 Tab by mouth 2 times a day, with meals.  Dispense: 180 Tab; Refill: 1    2. Thyroid disorder screen  - TSH WITH REFLEX TO FT4; Future    3. Hypovitaminosis D  - VITAMIN D,25 HYDROXY; Future    4. Dyslipidemia  - Lipid Profile; Future    5. Transaminitis  Prior ultrasound showing changes consistent with fatty liver.  Encouraged to continue working on weight loss  - Comp Metabolic Panel; Future    6. Subcutaneous mass  2 growths consistent with lipoma in the right back, the larger which is causing him concern and he is interested in getting this removed.  We will evaluate ultrasound to confirm that this is in fact a lipoma then likely refer for surgical consult  - US-CHEST; Future    7. Decreased hearing, bilateral  - REFERRAL TO AUDIOLOGY    8. DDD (degenerative disc disease), lumbosacral  Stable at this time with occasional use of Flexeril    9. Obesity (BMI 30-39.9)      Followup: pending labs         Please note that this dictation was created using voice recognition software. I have worked with consultants from the vendor as well as technical experts from [employer] to optimize the interface. I have made every reasonable attempt to correct obvious errors, but I expect that there are errors of grammar and possibly content that I did not discover before finalizing the note.

## 2020-07-22 NOTE — ASSESSMENT & PLAN NOTE
Notes decreased hearing bilaterally which is worsening over the last few years.  No history of ear trauma.  He did work around loud machinery when younger, he would like hearing eval

## 2020-07-22 NOTE — ASSESSMENT & PLAN NOTE
2 subcutaneous growths causing him trouble-1 in the right mid back and then a smaller one in the right low back.  He is interested in having the larger of these removed as it does cause him discomfort when lying down on his back.  No acute pain, no drainage

## 2020-07-22 NOTE — ASSESSMENT & PLAN NOTE
Patient states that his weight is fluctuated over the last few years, he has had success with weight loss when working on diet and exercise but is currently out of his routine

## 2020-07-22 NOTE — ASSESSMENT & PLAN NOTE
Reports history of chronic back pain and intermittent flares of sciatica.  Injections done by Dr. Neff in the past which have helped.  Rarely needing to take flexeril at this point.

## 2020-07-22 NOTE — ASSESSMENT & PLAN NOTE
Diagnosed last year with a1c 12.7  Was able to get down to 5.6 with diet change and weight loss but now has not been doing as well in recent months, has had some glucose readings at home up to 300.  He has regained the weight.  He was previously taking metformin 1000 mg twice daily but is currently out of medication.  No polyuria, polydipsia, numbness or tingling in extremities.  He does occasionally have blurred vision, he is due for an eye exam and planning to schedule this

## 2020-09-21 ENCOUNTER — IMMUNIZATION (OUTPATIENT)
Dept: OCCUPATIONAL MEDICINE | Facility: CLINIC | Age: 45
End: 2020-09-21

## 2020-09-21 DIAGNOSIS — Z23 NEED FOR VACCINATION: ICD-10-CM

## 2020-09-21 PROCEDURE — 90686 IIV4 VACC NO PRSV 0.5 ML IM: CPT | Performed by: PREVENTIVE MEDICINE

## 2020-12-17 DIAGNOSIS — Z23 NEED FOR VACCINATION: ICD-10-CM

## 2020-12-21 ENCOUNTER — APPOINTMENT (OUTPATIENT)
Dept: FAMILY PLANNING/WOMEN'S HEALTH CLINIC | Facility: IMMUNIZATION CENTER | Age: 45
End: 2020-12-21
Attending: FAMILY MEDICINE
Payer: COMMERCIAL

## 2020-12-21 DIAGNOSIS — Z23 NEED FOR VACCINATION: ICD-10-CM

## 2020-12-21 PROCEDURE — 0001A PFIZER SARS-COV-2 VACCINE: CPT

## 2020-12-21 PROCEDURE — 91300 PFIZER SARS-COV-2 VACCINE: CPT

## 2020-12-22 ENCOUNTER — IMMUNIZATION (OUTPATIENT)
Dept: FAMILY PLANNING/WOMEN'S HEALTH CLINIC | Facility: IMMUNIZATION CENTER | Age: 45
End: 2020-12-22
Payer: COMMERCIAL

## 2020-12-22 DIAGNOSIS — Z23 ENCOUNTER FOR VACCINATION: Primary | ICD-10-CM

## 2021-01-12 ENCOUNTER — IMMUNIZATION (OUTPATIENT)
Dept: FAMILY PLANNING/WOMEN'S HEALTH CLINIC | Facility: IMMUNIZATION CENTER | Age: 46
End: 2021-01-12
Attending: FAMILY MEDICINE
Payer: COMMERCIAL

## 2021-01-12 DIAGNOSIS — Z23 ENCOUNTER FOR VACCINATION: Primary | ICD-10-CM

## 2021-01-12 PROCEDURE — 91300 PFIZER SARS-COV-2 VACCINE: CPT

## 2021-01-12 PROCEDURE — 0002A PFIZER SARS-COV-2 VACCINE: CPT

## 2021-01-17 DIAGNOSIS — E11.9 NEW ONSET TYPE 2 DIABETES MELLITUS (HCC): ICD-10-CM

## 2021-01-26 ENCOUNTER — HOSPITAL ENCOUNTER (OUTPATIENT)
Dept: LAB | Facility: MEDICAL CENTER | Age: 46
End: 2021-01-26
Attending: NURSE PRACTITIONER
Payer: COMMERCIAL

## 2021-01-26 DIAGNOSIS — R74.01 TRANSAMINITIS: ICD-10-CM

## 2021-01-26 DIAGNOSIS — E55.9 HYPOVITAMINOSIS D: ICD-10-CM

## 2021-01-26 DIAGNOSIS — E11.9 NEW ONSET TYPE 2 DIABETES MELLITUS (HCC): ICD-10-CM

## 2021-01-26 DIAGNOSIS — Z13.29 THYROID DISORDER SCREEN: ICD-10-CM

## 2021-01-26 DIAGNOSIS — E78.5 DYSLIPIDEMIA: ICD-10-CM

## 2021-01-26 LAB
25(OH)D3 SERPL-MCNC: 30 NG/ML (ref 30–100)
ALBUMIN SERPL BCP-MCNC: 4.7 G/DL (ref 3.2–4.9)
ALBUMIN/GLOB SERPL: 1.7 G/DL
ALP SERPL-CCNC: 86 U/L (ref 30–99)
ALT SERPL-CCNC: 105 U/L (ref 2–50)
ANION GAP SERPL CALC-SCNC: 11 MMOL/L (ref 7–16)
AST SERPL-CCNC: 50 U/L (ref 12–45)
BILIRUB SERPL-MCNC: 0.7 MG/DL (ref 0.1–1.5)
BUN SERPL-MCNC: 14 MG/DL (ref 8–22)
CALCIUM SERPL-MCNC: 9.9 MG/DL (ref 8.5–10.5)
CHLORIDE SERPL-SCNC: 99 MMOL/L (ref 96–112)
CHOLEST SERPL-MCNC: 156 MG/DL (ref 100–199)
CO2 SERPL-SCNC: 26 MMOL/L (ref 20–33)
CREAT SERPL-MCNC: 0.86 MG/DL (ref 0.5–1.4)
EST. AVERAGE GLUCOSE BLD GHB EST-MCNC: 203 MG/DL
FASTING STATUS PATIENT QL REPORTED: NORMAL
GLOBULIN SER CALC-MCNC: 2.7 G/DL (ref 1.9–3.5)
GLUCOSE SERPL-MCNC: 154 MG/DL (ref 65–99)
HBA1C MFR BLD: 8.7 % (ref 0–5.6)
HDLC SERPL-MCNC: 35 MG/DL
LDLC SERPL CALC-MCNC: 96 MG/DL
POTASSIUM SERPL-SCNC: 3.8 MMOL/L (ref 3.6–5.5)
PROT SERPL-MCNC: 7.4 G/DL (ref 6–8.2)
SODIUM SERPL-SCNC: 136 MMOL/L (ref 135–145)
TRIGL SERPL-MCNC: 125 MG/DL (ref 0–149)
TSH SERPL DL<=0.005 MIU/L-ACNC: 2.84 UIU/ML (ref 0.38–5.33)

## 2021-01-26 PROCEDURE — 80061 LIPID PANEL: CPT

## 2021-01-26 PROCEDURE — 80053 COMPREHEN METABOLIC PANEL: CPT

## 2021-01-26 PROCEDURE — 84443 ASSAY THYROID STIM HORMONE: CPT

## 2021-01-26 PROCEDURE — 83036 HEMOGLOBIN GLYCOSYLATED A1C: CPT

## 2021-01-26 PROCEDURE — 36415 COLL VENOUS BLD VENIPUNCTURE: CPT

## 2021-01-26 PROCEDURE — 82306 VITAMIN D 25 HYDROXY: CPT

## 2021-02-23 ENCOUNTER — OFFICE VISIT (OUTPATIENT)
Dept: MEDICAL GROUP | Facility: MEDICAL CENTER | Age: 46
End: 2021-02-23
Payer: COMMERCIAL

## 2021-02-23 VITALS
WEIGHT: 259.04 LBS | DIASTOLIC BLOOD PRESSURE: 90 MMHG | BODY MASS INDEX: 35.09 KG/M2 | RESPIRATION RATE: 20 BRPM | OXYGEN SATURATION: 95 % | HEART RATE: 66 BPM | SYSTOLIC BLOOD PRESSURE: 140 MMHG | HEIGHT: 72 IN | TEMPERATURE: 97.3 F

## 2021-02-23 DIAGNOSIS — E66.9 OBESITY (BMI 30-39.9): ICD-10-CM

## 2021-02-23 DIAGNOSIS — E11.69 DYSLIPIDEMIA ASSOCIATED WITH TYPE 2 DIABETES MELLITUS (HCC): ICD-10-CM

## 2021-02-23 DIAGNOSIS — E55.9 HYPOVITAMINOSIS D: ICD-10-CM

## 2021-02-23 DIAGNOSIS — M51.37 DDD (DEGENERATIVE DISC DISEASE), LUMBOSACRAL: ICD-10-CM

## 2021-02-23 DIAGNOSIS — R74.01 TRANSAMINITIS: ICD-10-CM

## 2021-02-23 DIAGNOSIS — Z00.00 ANNUAL PHYSICAL EXAM: ICD-10-CM

## 2021-02-23 DIAGNOSIS — E78.5 DYSLIPIDEMIA ASSOCIATED WITH TYPE 2 DIABETES MELLITUS (HCC): ICD-10-CM

## 2021-02-23 PROCEDURE — 99396 PREV VISIT EST AGE 40-64: CPT | Performed by: NURSE PRACTITIONER

## 2021-02-23 PROCEDURE — 99213 OFFICE O/P EST LOW 20 MIN: CPT | Mod: 25 | Performed by: NURSE PRACTITIONER

## 2021-02-23 RX ORDER — SEMAGLUTIDE 1.34 MG/ML
0.25 INJECTION, SOLUTION SUBCUTANEOUS
Qty: 1.5 ML | Refills: 5 | Status: SHIPPED | OUTPATIENT
Start: 2021-02-23 | End: 2021-08-06

## 2021-02-23 ASSESSMENT — PATIENT HEALTH QUESTIONNAIRE - PHQ9: CLINICAL INTERPRETATION OF PHQ2 SCORE: 0

## 2021-02-23 NOTE — ASSESSMENT & PLAN NOTE
Vitamin D level has improved, now at the low end of normal at 30.  He is not consistent with vitamin D supplement

## 2021-02-23 NOTE — PROGRESS NOTES
Chief Complaint   Patient presents with   • Annual Exam     review labs     Ross Ryder is a 46 y.o. male here for annual and follow-up of recent labs.  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 Received injection in his back last year     Dyslipidemia associated with type 2 diabetes mellitus (HCC)  Most recent lipid panel is improved although HDL remains low at 35    Uncontrolled type 2 diabetes mellitus with complication, without long-term current use of insulin (HCC)  A1c remains uncontrolled at 8.7.  He was able to get this down to normal range through diet, exercise, weight loss a few months ago.  However, he has had a great deal of stress in the last few months-girlfriend has had to have brain surgery for pituitary adenoma.  He is not been doing as well with his diet, he is not exercising and has put back on the weight.  At times his glucose is as high as 300s.  He has been consistently taking Metformin 1000 mg twice daily.  He would really like to work on improving his glycemic control as well as his weight  No polyuria, polydipsia, numbness or tingling in extremities.  He is due for eye exam.    Transaminitis  Liver enzymes continue to be elevated and recent labs.  He has had an informal ultrasound of the liver that he believes was consistent with fatty liver changes.  I did not have any records of this for review.  He denies any abdominal pain, nausea, stool changes.  No alcohol use    Hypovitaminosis D  Vitamin D level has improved, now at the low end of normal at 30.  He is not consistent with vitamin D supplement    Blood pressure is borderline in clinic today, no history of hypertension.  We have discussed that working on weight loss, cardio exercise, low-sodium diet may help with this.  He will also monitor blood pressure occasionally at work and may contact me with these  readings.    Current medicines (including changes today)  Current Outpatient Medications   Medication Sig Dispense Refill   • Semaglutide,0.25 or 0.5MG/DOS, (OZEMPIC, 0.25 OR 0.5 MG/DOSE,) 2 MG/1.5ML Solution Pen-injector Inject 0.25 mg under the skin every 7 days. 1.5 mL 5   • metformin (GLUCOPHAGE) 1000 MG tablet TAKE 1 TABLET BY MOUTH TWICE A DAY WITH MEALS 180 Tab 0   • vitamin D, Ergocalciferol, (DRISDOL) 03093 units Cap capsule Take 1 Cap by mouth every 7 days. 12 Cap 0   • Ibuprofen (MOTRIN PO) Take  by mouth.       No current facility-administered medications for this visit.     He  has a past medical history of IFG (impaired fasting glucose) and Obesity.  He  has no past surgical history on file.  Social History     Tobacco Use   • Smoking status: Never Smoker   • Smokeless tobacco: Never Used   Substance Use Topics   • Alcohol use: No     Alcohol/week: 0.0 oz     Comment: social   • Drug use: No     Social History     Social History Narrative   • Not on file     Family History   Problem Relation Age of Onset   • Cancer Maternal Aunt         anal   • Diabetes Father    • Other Father         liver   • Cancer Father         liver   • Diabetes Paternal Grandfather    • Allergies Mother    • Allergies Sister    • Diabetes Sister    • Heart Disease Neg Hx    • Hypertension Neg Hx    • Hyperlipidemia Neg Hx    • Psychiatric Illness Neg Hx    • Stroke Neg Hx    • Thyroid Neg Hx      Family Status   Relation Name Status   • MAunt  (Not Specified)   • Fa     • PGFa  (Not Specified)   • Mo  Alive   • Sis  Alive   • Neg Hx  (Not Specified)         ROS  Problems listed discussed above, all other systems reviewed and negative     Objective:     /90 (BP Location: Right arm, Patient Position: Sitting, BP Cuff Size: Adult)   Pulse 66   Temp 36.3 °C (97.3 °F) (Temporal)   Resp 20   Ht 1.829 m (6')   Wt 118 kg (259 lb 0.7 oz)   SpO2 95%  Body mass index is 35.13 kg/m².  Physical Exam:  General: Alert,  oriented in no acute distress.  Eye contact is good, speech is normal, affect calm  HEENT:  TMs gray with good landmarks bilaterally. No cervical or supraclavicular lymphadenopathy, thyroid isthmus palpable without masses or nodules.  Lungs: clear to auscultation bilaterally, good aeration, normal effort. No wheeze/ rhonchi/ rales.  CV: regular rate and rhythm, S1, S2. No murmur, no JVD, no edema. Pedal pulses 2 + bilaterally  Abdomen: soft, nontender, BS x4, no hepatosplenomegaly.  Ext: color normal, vascularity normal, temperature normal. No rash or lesions.    Assessment and Plan:   The following treatment plan was discussed   1. Annual physical exam  Normal physical exam except as detailed below.  Uncontrolled problems addressed General health and wellness discussion including healthy diet, regular exercise. 2000 iu Vit d3 advised daily.  Advised regular dental cleanings, eye exam yearly.     2. DDD (degenerative disc disease), lumbosacral   generally stable at this time   3. Uncontrolled type 2 diabetes mellitus with complication, without long-term current use of insulin (HCC)   remains uncontrolled.  His weight has increased in the last few months as well.  He has been consistent with metformin but this is not sufficient for managing his diabetes at this time.  We will start trial of Ozempic.  Risks and side effects reviewed, plan for gradual increase as tolerated.  Labs in 3 months  HEMOGLOBIN A1C    MICROALBUMIN CREAT RATIO URINE    Semaglutide,0.25 or 0.5MG/DOS, (OZEMPIC, 0.25 OR 0.5 MG/DOSE,) 2 MG/1.5ML Solution Pen-injector   4. Transaminitis   possible prior diagnosis of fatty liver, no records available for review.  We will obtain ultrasound  US-RUQ   5. Dyslipidemia associated with type 2 diabetes mellitus (HCC)   improved and most recent labs   6. Obesity (BMI 30-39.9)   encouraged to work on weight loss   7. Hypovitaminosis D   2000 international units vitamin D3 advise daily       Educated in proper  administration of medication(s) ordered today including safety, possible SE, risks, benefits, rationale and alternatives to therapy.     Followup: Labs in 3 months             Please note that this dictation was created using voice recognition software. I have worked with consultants from the vendor as well as technical experts from [facility] to optimize the interface. I have made every reasonable attempt to correct obvious errors, but I expect that there are errors of grammar and possibly content that I did not discover before finalizing the note.

## 2021-02-23 NOTE — ASSESSMENT & PLAN NOTE
Liver enzymes continue to be elevated and recent labs.  He has had an informal ultrasound of the liver that he believes was consistent with fatty liver changes.  I did not have any records of this for review.  He denies any abdominal pain, nausea, stool changes.  No alcohol use

## 2021-02-23 NOTE — ASSESSMENT & PLAN NOTE
A1c remains uncontrolled at 8.7.  He was able to get this down to normal range through diet, exercise, weight loss a few months ago.  However, he has had a great deal of stress in the last few months-girlfriend has had to have brain surgery for pituitary adenoma.  He is not been doing as well with his diet, he is not exercising and has put back on the weight.  At times his glucose is as high as 300s.  He has been consistently taking Metformin 1000 mg twice daily.  He would really like to work on improving his glycemic control as well as his weight  No polyuria, polydipsia, numbness or tingling in extremities.  He is due for eye exam.

## 2021-04-19 DIAGNOSIS — E11.9 NEW ONSET TYPE 2 DIABETES MELLITUS (HCC): ICD-10-CM

## 2021-08-06 ENCOUNTER — OFFICE VISIT (OUTPATIENT)
Dept: URGENT CARE | Facility: CLINIC | Age: 46
End: 2021-08-06
Payer: COMMERCIAL

## 2021-08-06 ENCOUNTER — HOSPITAL ENCOUNTER (OUTPATIENT)
Facility: MEDICAL CENTER | Age: 46
End: 2021-08-06
Attending: NURSE PRACTITIONER
Payer: COMMERCIAL

## 2021-08-06 VITALS
HEIGHT: 72 IN | WEIGHT: 249 LBS | TEMPERATURE: 97.1 F | SYSTOLIC BLOOD PRESSURE: 110 MMHG | OXYGEN SATURATION: 96 % | HEART RATE: 73 BPM | DIASTOLIC BLOOD PRESSURE: 74 MMHG | BODY MASS INDEX: 33.72 KG/M2

## 2021-08-06 DIAGNOSIS — R05.9 COUGH: ICD-10-CM

## 2021-08-06 DIAGNOSIS — J06.9 URI, ACUTE: ICD-10-CM

## 2021-08-06 DIAGNOSIS — J40 BRONCHITIS: ICD-10-CM

## 2021-08-06 DIAGNOSIS — J34.89 SINUS DRAINAGE: ICD-10-CM

## 2021-08-06 PROCEDURE — U0005 INFEC AGEN DETEC AMPLI PROBE: HCPCS

## 2021-08-06 PROCEDURE — 99213 OFFICE O/P EST LOW 20 MIN: CPT | Performed by: NURSE PRACTITIONER

## 2021-08-06 PROCEDURE — U0003 INFECTIOUS AGENT DETECTION BY NUCLEIC ACID (DNA OR RNA); SEVERE ACUTE RESPIRATORY SYNDROME CORONAVIRUS 2 (SARS-COV-2) (CORONAVIRUS DISEASE [COVID-19]), AMPLIFIED PROBE TECHNIQUE, MAKING USE OF HIGH THROUGHPUT TECHNOLOGIES AS DESCRIBED BY CMS-2020-01-R: HCPCS

## 2021-08-06 RX ORDER — DOXYCYCLINE HYCLATE 100 MG
100 TABLET ORAL 2 TIMES DAILY
Qty: 14 TABLET | Refills: 0 | Status: SHIPPED | OUTPATIENT
Start: 2021-08-06 | End: 2021-08-13

## 2021-08-06 RX ORDER — ALBUTEROL SULFATE 90 UG/1
2 AEROSOL, METERED RESPIRATORY (INHALATION) EVERY 6 HOURS PRN
Qty: 8.5 G | Refills: 0 | Status: SHIPPED | OUTPATIENT
Start: 2021-08-06 | End: 2022-01-24

## 2021-08-06 ASSESSMENT — ENCOUNTER SYMPTOMS
COUGH: 1
FEVER: 1

## 2021-08-06 NOTE — PROGRESS NOTES
Subjective:      kings Ryder is a 46 y.o. male who presents with Coronavirus Screening (x 4 days)    Past Medical History:   Diagnosis Date   • IFG (impaired fasting glucose)    • Obesity      Social History     Socioeconomic History   • Marital status: Single     Spouse name: Not on file   • Number of children: Not on file   • Years of education: Not on file   • Highest education level: Not on file   Occupational History   • Not on file   Tobacco Use   • Smoking status: Never Smoker   • Smokeless tobacco: Never Used   Vaping Use   • Vaping Use: Never used   Substance and Sexual Activity   • Alcohol use: Yes     Alcohol/week: 0.0 oz     Comment: social   • Drug use: Yes     Types: Marijuana, Oral     Comment: CBD    • Sexual activity: Not on file   Other Topics Concern   •  Service No   • Blood Transfusions No   • Caffeine Concern No   • Occupational Exposure No   • Hobby Hazards No   • Sleep Concern No   • Stress Concern No   • Weight Concern No   • Special Diet No   • Back Care No   • Exercise Yes   • Bike Helmet No   • Seat Belt Yes   • Self-Exams Yes   Social History Narrative   • Not on file     Social Determinants of Health     Financial Resource Strain:    • Difficulty of Paying Living Expenses:    Food Insecurity:    • Worried About Running Out of Food in the Last Year:    • Ran Out of Food in the Last Year:    Transportation Needs:    • Lack of Transportation (Medical):    • Lack of Transportation (Non-Medical):    Physical Activity:    • Days of Exercise per Week:    • Minutes of Exercise per Session:    Stress:    • Feeling of Stress :    Social Connections:    • Frequency of Communication with Friends and Family:    • Frequency of Social Gatherings with Friends and Family:    • Attends Sabianism Services:    • Active Member of Clubs or Organizations:    • Attends Club or Organization Meetings:    • Marital Status:    Intimate Partner Violence:    • Fear of Current or Ex-Partner:    •  Emotionally Abused:    • Physically Abused:    • Sexually Abused:      Family History   Problem Relation Age of Onset   • Cancer Maternal Aunt         anal   • Diabetes Father    • Other Father         liver   • Cancer Father         liver   • Diabetes Paternal Grandfather    • Allergies Mother    • Allergies Sister    • Diabetes Sister    • Heart Disease Neg Hx    • Hypertension Neg Hx    • Hyperlipidemia Neg Hx    • Psychiatric Illness Neg Hx    • Stroke Neg Hx    • Thyroid Neg Hx        Allergies: Penicillin g    Patient is a 46-year-old male who presents today with complaint of nasal congestion, cough, and some mild tightness in the chest.  Onset of symptoms over the last week.  Patient recently traveled to [place].  He has been fully vaccinated against Covid.  He states feeling slightly feverish.  No shortness of breath.  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      Patient endorses scant amount of sinus drainage and pressure.  Positive prior history of sinus infections.    URI   This is a new problem. The current episode started in the past 7 days. The problem has been unchanged. There has been no fever. Associated symptoms include congestion and coughing. He has tried nothing for the symptoms. The treatment provided no relief.       Review of Systems   Constitutional: Positive for fever and malaise/fatigue.   HENT: Positive for congestion.    Respiratory: Positive for cough.    Skin: Negative.    All other systems reviewed and are negative.         Objective:     /74 (BP Location: Right arm, Patient Position: Sitting, BP Cuff Size: Adult long)   Pulse 73   Temp 36.2 °C (97.1 °F) (Temporal)   Ht 1.829 m (6')   Wt 113 kg (249 lb)   SpO2 96%   BMI 33.77 kg/m²      Physical Exam  Vitals reviewed.   Constitutional:       Appearance: Normal appearance.   HENT:      Head: Normocephalic and atraumatic.      Right Ear: Tympanic membrane, ear canal and external ear normal.       Left Ear: Tympanic membrane, ear canal and external ear normal.      Nose: Congestion present.      Mouth/Throat:      Mouth: Mucous membranes are moist.   Eyes:      Extraocular Movements: Extraocular movements intact.      Conjunctiva/sclera: Conjunctivae normal.      Pupils: Pupils are equal, round, and reactive to light.   Cardiovascular:      Rate and Rhythm: Normal rate and regular rhythm.      Heart sounds: Normal heart sounds.   Pulmonary:      Effort: Pulmonary effort is normal. No respiratory distress.      Breath sounds: Normal breath sounds. No stridor. No wheezing, rhonchi or rales.   Chest:      Chest wall: No tenderness.   Musculoskeletal:         General: Normal range of motion.      Cervical back: Normal range of motion and neck supple.   Skin:     General: Skin is warm and dry.   Neurological:      Mental Status: He is alert and oriented to person, place, and time.   Psychiatric:         Mood and Affect: Mood normal.         Behavior: Behavior normal.                        Assessment/Plan:   Upper respiratory infection  Cough  Bronchitis  Sinus drainage    Covid nasal swab obtained  Self quarantine until results received  Patient advised he will receive results via MyChart  Prescription for doxycycline; start only for development of purulent nasal drainage  Albuterol as needed  Humidifier  Follow-up in urgent care otherwise for any further questions or concerns     There are no diagnoses linked to this encounter.

## 2021-08-07 DIAGNOSIS — J06.9 URI, ACUTE: ICD-10-CM

## 2021-08-07 DIAGNOSIS — J40 BRONCHITIS: ICD-10-CM

## 2021-08-07 DIAGNOSIS — R05.9 COUGH: ICD-10-CM

## 2021-08-07 LAB — COVID ORDER STATUS COVID19: NORMAL

## 2021-08-08 LAB
SARS-COV-2 RNA RESP QL NAA+PROBE: DETECTED
SPECIMEN SOURCE: ABNORMAL

## 2021-08-13 ENCOUNTER — TELEPHONE (OUTPATIENT)
Dept: OCCUPATIONAL MEDICINE | Facility: CLINIC | Age: 46
End: 2021-08-13

## 2021-08-13 NOTE — TELEPHONE ENCOUNTER
Pt. was positive for COVID-19.  On 8/15 it will be 10 days since onset of symptoms.The pt. states they have been afebrile for 24 hours, free from N/D for 48 hours, improvement in respiratory symptoms.     PT states that his girlfriend, same household, has just come back positive for COVID. Since the pt has been cleared and has been vaccinated, he will be watching for new symptoms for 5 days. If he develops any, he will call the employee screening line. Pt states understanding.       Cleared to return to work on 8/15/21.

## 2021-09-29 ENCOUNTER — IMMUNIZATION (OUTPATIENT)
Dept: OCCUPATIONAL MEDICINE | Facility: CLINIC | Age: 46
End: 2021-09-29

## 2021-09-29 DIAGNOSIS — Z23 ENCOUNTER FOR VACCINATION: Primary | ICD-10-CM

## 2021-09-29 PROCEDURE — 0003A PFIZER SARS-COV-2 VACCINE: CPT | Performed by: INTERNAL MEDICINE

## 2021-09-29 PROCEDURE — 91300 PFIZER SARS-COV-2 VACCINE: CPT | Performed by: INTERNAL MEDICINE

## 2022-01-24 ENCOUNTER — OFFICE VISIT (OUTPATIENT)
Dept: MEDICAL GROUP | Facility: LAB | Age: 47
End: 2022-01-24
Payer: COMMERCIAL

## 2022-01-24 ENCOUNTER — HOSPITAL ENCOUNTER (OUTPATIENT)
Dept: LAB | Facility: MEDICAL CENTER | Age: 47
End: 2022-01-24
Attending: FAMILY MEDICINE
Payer: COMMERCIAL

## 2022-01-24 VITALS
DIASTOLIC BLOOD PRESSURE: 84 MMHG | WEIGHT: 240 LBS | SYSTOLIC BLOOD PRESSURE: 136 MMHG | HEIGHT: 72 IN | BODY MASS INDEX: 32.51 KG/M2 | OXYGEN SATURATION: 97 % | RESPIRATION RATE: 12 BRPM | HEART RATE: 67 BPM | TEMPERATURE: 97 F

## 2022-01-24 DIAGNOSIS — E55.9 HYPOVITAMINOSIS D: ICD-10-CM

## 2022-01-24 DIAGNOSIS — R74.01 TRANSAMINITIS: ICD-10-CM

## 2022-01-24 DIAGNOSIS — E11.69 DYSLIPIDEMIA ASSOCIATED WITH TYPE 2 DIABETES MELLITUS (HCC): ICD-10-CM

## 2022-01-24 DIAGNOSIS — E11.9 NEW ONSET TYPE 2 DIABETES MELLITUS (HCC): ICD-10-CM

## 2022-01-24 DIAGNOSIS — E78.5 DYSLIPIDEMIA ASSOCIATED WITH TYPE 2 DIABETES MELLITUS (HCC): ICD-10-CM

## 2022-01-24 PROBLEM — Z00.00 HEALTH CARE MAINTENANCE: Status: RESOLVED | Noted: 2017-06-26 | Resolved: 2022-01-24

## 2022-01-24 LAB
25(OH)D3 SERPL-MCNC: 26 NG/ML (ref 30–100)
ALBUMIN SERPL BCP-MCNC: 4.6 G/DL (ref 3.2–4.9)
ALBUMIN/GLOB SERPL: 1.6 G/DL
ALP SERPL-CCNC: 105 U/L (ref 30–99)
ALT SERPL-CCNC: 113 U/L (ref 2–50)
ANION GAP SERPL CALC-SCNC: 11 MMOL/L (ref 7–16)
AST SERPL-CCNC: 66 U/L (ref 12–45)
BILIRUB SERPL-MCNC: 0.6 MG/DL (ref 0.1–1.5)
BUN SERPL-MCNC: 13 MG/DL (ref 8–22)
CALCIUM SERPL-MCNC: 9.3 MG/DL (ref 8.5–10.5)
CHLORIDE SERPL-SCNC: 99 MMOL/L (ref 96–112)
CHOLEST SERPL-MCNC: 168 MG/DL (ref 100–199)
CO2 SERPL-SCNC: 25 MMOL/L (ref 20–33)
CREAT SERPL-MCNC: 0.92 MG/DL (ref 0.5–1.4)
CREAT UR-MCNC: 137.73 MG/DL
FASTING STATUS PATIENT QL REPORTED: NORMAL
GLOBULIN SER CALC-MCNC: 2.8 G/DL (ref 1.9–3.5)
GLUCOSE SERPL-MCNC: 329 MG/DL (ref 65–99)
HBA1C MFR BLD: 12 % (ref 0–5.6)
HDLC SERPL-MCNC: 34 MG/DL
INT CON NEG: NEGATIVE
INT CON POS: POSITIVE
LDLC SERPL CALC-MCNC: 109 MG/DL
MICROALBUMIN UR-MCNC: 2.7 MG/DL
MICROALBUMIN/CREAT UR: 20 MG/G (ref 0–30)
POTASSIUM SERPL-SCNC: 4 MMOL/L (ref 3.6–5.5)
PROT SERPL-MCNC: 7.4 G/DL (ref 6–8.2)
SODIUM SERPL-SCNC: 135 MMOL/L (ref 135–145)
TRIGL SERPL-MCNC: 125 MG/DL (ref 0–149)

## 2022-01-24 PROCEDURE — 82306 VITAMIN D 25 HYDROXY: CPT

## 2022-01-24 PROCEDURE — 82570 ASSAY OF URINE CREATININE: CPT

## 2022-01-24 PROCEDURE — 82043 UR ALBUMIN QUANTITATIVE: CPT

## 2022-01-24 PROCEDURE — 99214 OFFICE O/P EST MOD 30 MIN: CPT | Performed by: FAMILY MEDICINE

## 2022-01-24 PROCEDURE — 80053 COMPREHEN METABOLIC PANEL: CPT

## 2022-01-24 PROCEDURE — 36415 COLL VENOUS BLD VENIPUNCTURE: CPT

## 2022-01-24 PROCEDURE — 83036 HEMOGLOBIN GLYCOSYLATED A1C: CPT | Performed by: FAMILY MEDICINE

## 2022-01-24 PROCEDURE — 80061 LIPID PANEL: CPT

## 2022-01-24 PROCEDURE — 84403 ASSAY OF TOTAL TESTOSTERONE: CPT

## 2022-01-24 PROCEDURE — 84270 ASSAY OF SEX HORMONE GLOBUL: CPT

## 2022-01-24 PROCEDURE — 84402 ASSAY OF FREE TESTOSTERONE: CPT

## 2022-01-24 ASSESSMENT — PATIENT HEALTH QUESTIONNAIRE - PHQ9: CLINICAL INTERPRETATION OF PHQ2 SCORE: 0

## 2022-01-24 NOTE — PROGRESS NOTES
Chief Complaint   Patient presents with   • New Patient         Ross Ryder is a 46 y.o. male here to establish care and for evaluation and management of:        HPI:    Working in interventional radiology. Supervisor.   Only taking metformin. Has been as high as 12% A1C.   Diet: Not bad, but not great. Stress worse of late. Trying to restrict carbs and sugar intake.   Exercise: Not much. Started biking last week.   Sleep: good. Could use more but better lately    Has noticed some tingling and cramping in legs.   Also feeling like he has lost a lot of strength as well.     Does see Dr Neff for back issues as well. Will set up with him. L4-5 Foraminal stenosis.     Chronic stress, work and home.     Does note loss of strength severely in the last year.     Allergies   Allergen Reactions   • Penicillin G        Current medicines (including changes today)  Current Outpatient Medications   Medication Sig Dispense Refill   • Dulaglutide 0.75 MG/0.5ML Solution Pen-injector Inject 0.5 mL under the skin every 7 days. 1.96 mL 12   • metformin (GLUCOPHAGE) 1000 MG tablet Take 1 Tablet by mouth 2 times a day with meals. 180 Tablet 1     No current facility-administered medications for this visit.     He  has a past medical history of Diabetes (HCC), IFG (impaired fasting glucose), and Obesity.  He  has no past surgical history on file.  Social History     Tobacco Use   • Smoking status: Never Smoker   • Smokeless tobacco: Never Used   Vaping Use   • Vaping Use: Never used   Substance Use Topics   • Alcohol use: Yes     Alcohol/week: 0.0 oz     Comment: social   • Drug use: Yes     Types: Marijuana, Oral     Comment: CBD      Social History     Social History Narrative   • Not on file     Family History   Problem Relation Age of Onset   • Cancer Maternal Aunt         anal   • Diabetes Father    • Other Father         liver transplant , Hep C   • Cancer Father         liver   • Diabetes Paternal Grandfather    • Allergies  Mother    • Allergies Sister    • Diabetes Sister    • Diabetes Brother    • Diabetes Paternal Uncle    • Heart Disease Neg Hx    • Hypertension Neg Hx    • Hyperlipidemia Neg Hx    • Psychiatric Illness Neg Hx    • Stroke Neg Hx    • Thyroid Neg Hx      Family Status   Relation Name Status   • MAunt  (Not Specified)   • Fa     • PGFa  (Not Specified)   • Mo  Alive   • Sis  Alive   • Neg Hx  (Not Specified)         ROS  No fever or chills.  No nausea or vomiting.  No chest pain or palpitations.  No cough or SOB.  No pain with urination or hematuria.  No black or bloody stools.  All other systems reviewed and are negative     Objective:     /84 (BP Location: Right arm, Patient Position: Sitting, BP Cuff Size: Adult)   Pulse 67   Temp 36.1 °C (97 °F)   Resp 12   Ht 1.829 m (6')   Wt 109 kg (240 lb)   SpO2 97%  Body mass index is 32.55 kg/m².  Physical Exam:      Well developed, well nourished.  Alert, oriented in no acute distress.  Psych: Eye contact is good, speech goal directed, affect calm  Eyes: conjunctiva non-injected, sclera non-icteric.  Ears: Pinna normal. TM pearly gray.   Nose: Nares are patent.  Normal mucosa  Mouth: Oral mucous membranes pink and moist with no lesions.  Neck Supple.  No adenopathy or masses in the neck or supraclavicular regions. No thyromegaly  Lungs: clear to auscultation bilaterally with good excursion. No wheezes or rhonchi  CV: regular rate and rhythm. No murmur  Abdomen: soft, nontender, no masses or organomegaly.  No rebound or gaurding  Ext: no edema, color normal, vascularity normal, temperature normal      Assessment and Plan:   The following treatment plan was discussed    1. Uncontrolled type 2 diabetes mellitus with complication, without long-term current use of insulin (HCC)  Discussed blood sugar control in general.  Discussed the role that chronic stress has on this as well.  Discussed changes in diet and also exercise that he needs to embark upon.  We  will try adding Trulicity to his regimen to see if this is covered by insurance.  We will also get updated blood work otherwise as he may need a statin and an ACE inhibitor as well.  - POCT Hemoglobin A1C  - Comp Metabolic Panel; Future  - MICROALBUMIN CREAT RATIO URINE; Future  - Testosterone, Free & Total, Adult Male (w/SHBG); Future  - Dulaglutide 0.75 MG/0.5ML Solution Pen-injector; Inject 0.5 mL under the skin every 7 days.  Dispense: 1.96 mL; Refill: 12  - metformin (GLUCOPHAGE) 1000 MG tablet; Take 1 Tablet by mouth 2 times a day with meals.  Dispense: 180 Tablet; Refill: 1    2. Dyslipidemia associated with type 2 diabetes mellitus (HCC)  See above.  - Lipid Profile; Future    3. Transaminitis  We will recheck with labs.    4. Hypovitaminosis D  He does take vitamin D supplement.  We will recheck this as well.  - VITAMIN D,25 HYDROXY; Future                      Followup: No follow-ups on file.

## 2022-01-26 LAB
SHBG SERPL-SCNC: 39 NMOL/L (ref 11–80)
TESTOST FREE MFR SERPL: 1.7 % (ref 1.6–2.9)
TESTOST FREE SERPL-MCNC: 78 PG/ML (ref 47–244)
TESTOST SERPL-MCNC: 455 NG/DL (ref 300–890)

## 2022-02-28 ENCOUNTER — OFFICE VISIT (OUTPATIENT)
Dept: PHYSICAL MEDICINE AND REHAB | Facility: MEDICAL CENTER | Age: 47
End: 2022-02-28
Payer: COMMERCIAL

## 2022-02-28 VITALS
TEMPERATURE: 97 F | SYSTOLIC BLOOD PRESSURE: 102 MMHG | DIASTOLIC BLOOD PRESSURE: 70 MMHG | HEART RATE: 64 BPM | BODY MASS INDEX: 33.26 KG/M2 | HEIGHT: 72 IN | OXYGEN SATURATION: 97 % | WEIGHT: 245.59 LBS

## 2022-02-28 DIAGNOSIS — M54.16 LUMBAR RADICULOPATHY: ICD-10-CM

## 2022-02-28 DIAGNOSIS — M51.37 DDD (DEGENERATIVE DISC DISEASE), LUMBOSACRAL: ICD-10-CM

## 2022-02-28 PROCEDURE — 99214 OFFICE O/P EST MOD 30 MIN: CPT | Performed by: PHYSICAL MEDICINE & REHABILITATION

## 2022-02-28 RX ORDER — DULAGLUTIDE 1.5 MG/.5ML
INJECTION, SOLUTION SUBCUTANEOUS
COMMUNITY
Start: 2022-02-17

## 2022-02-28 RX ORDER — EMPAGLIFLOZIN, METFORMIN HYDROCHLORIDE 12.5; 1 MG/1; MG/1
TABLET, EXTENDED RELEASE ORAL 2 TIMES DAILY
COMMUNITY
Start: 2022-02-15

## 2022-02-28 ASSESSMENT — PAIN SCALES - GENERAL: PAINLEVEL: 8=MODERATE-SEVERE PAIN

## 2022-02-28 ASSESSMENT — PATIENT HEALTH QUESTIONNAIRE - PHQ9
5. POOR APPETITE OR OVEREATING: 1 - SEVERAL DAYS
SUM OF ALL RESPONSES TO PHQ QUESTIONS 1-9: 5
CLINICAL INTERPRETATION OF PHQ2 SCORE: 1

## 2022-02-28 NOTE — H&P (VIEW-ONLY)
follow up patient note  Interventional spine and sports physiatry, Physical medicine rehabilitation      Chief complaint:   Chief Complaint   Patient presents with   • Follow-Up     Back pain         HISTORY    Please see new patient note by Dr Neff,  for more details.     HPI  Patient identification: Ross Ryder  1975,    With Diagnoses of Lumbar radiculopathy and Uncontrolled type 2 diabetes mellitus with complication, without long-term current use of insulin (HCC) were pertinent to this visit.     Procedures:  2019 left L4-5 and L5-S1 transforaminal epidural steroid injection 100 for pain relief for about 2 years    The patient had excellent pain relief after the epidural steroid injection done previously. However he has been having worsening of pain. The majority of this pain radiates down the left thigh including the lateral which goes then anterior and to the medial aspect of the left knee. This can radiate down the left leg typically the posterior lateral aspect and sometimes involving the calf. 7-8/10 intensity worse at night. Associated cramping sensation.     The patient notes that over the past several weeks his glucose has been under excellent control with endocrinology. His average glucose is 120-140 at home.    He has been to physical therapy and completed this. He has a home exercise program and has done this including the past three months.     Medications tried include ibuprofen, tylneol, muscle relaxers.          ROS Red Flags :   Fever, Chills, Sweats: Denies  Involuntary Weight Loss: Denies  Bowel/Bladder Incontinence: Denies  Saddle Anesthesia: Denies        PMHx:   Past Medical History:   Diagnosis Date   • Back pain    • Diabetes (HCC)    • IFG (impaired fasting glucose)    • Obesity        PSHx:   History reviewed. No pertinent surgical history.    Family history   Family History   Problem Relation Age of Onset   • Cancer Maternal Aunt         anal   • Diabetes Father     • Other Father         liver transplant , Hep C   • Cancer Father         liver   • Diabetes Paternal Grandfather    • Allergies Mother    • Allergies Sister    • Diabetes Sister    • Diabetes Brother    • Diabetes Paternal Uncle    • Heart Disease Neg Hx    • Hypertension Neg Hx    • Hyperlipidemia Neg Hx    • Psychiatric Illness Neg Hx    • Stroke Neg Hx    • Thyroid Neg Hx          Medications:   No outpatient medications have been marked as taking for the 2/28/22 encounter (Office Visit) with Edmund Neff M.D..        Current Outpatient Medications on File Prior to Visit   Medication Sig Dispense Refill   • Dulaglutide 0.75 MG/0.5ML Solution Pen-injector Inject 0.5 mL under the skin every 7 days. 1.96 mL 12   • metformin (GLUCOPHAGE) 1000 MG tablet Take 1 Tablet by mouth 2 times a day with meals. 180 Tablet 1     No current facility-administered medications on file prior to visit.         Allergies:   Allergies   Allergen Reactions   • Penicillin G        Social Hx:   Social History     Socioeconomic History   • Marital status: Single     Spouse name: Not on file   • Number of children: Not on file   • Years of education: Not on file   • Highest education level: Not on file   Occupational History   • Not on file   Tobacco Use   • Smoking status: Never Smoker   • Smokeless tobacco: Never Used   Vaping Use   • Vaping Use: Never used   Substance and Sexual Activity   • Alcohol use: Yes     Alcohol/week: 0.0 oz     Comment: social   • Drug use: Yes     Types: Marijuana, Oral     Comment: CBD    • Sexual activity: Not on file   Other Topics Concern   •  Service No   • Blood Transfusions No   • Caffeine Concern No   • Occupational Exposure No   • Hobby Hazards No   • Sleep Concern No   • Stress Concern No   • Weight Concern No   • Special Diet No   • Back Care No   • Exercise Yes   • Bike Helmet No   • Seat Belt Yes   • Self-Exams Yes   Social History Narrative   • Not on file     Social Determinants of  Health     Financial Resource Strain: Not on file   Food Insecurity: Not on file   Transportation Needs: Not on file   Physical Activity: Not on file   Stress: Not on file   Social Connections: Not on file   Intimate Partner Violence: Not on file   Housing Stability: Not on file         EXAMINATION     Physical Exam:   Vitals: /70 (BP Location: Right arm, Patient Position: Sitting, BP Cuff Size: Adult)   Pulse 64   Temp 36.1 °C (97 °F) (Temporal)   Ht 1.829 m (6')   Wt 111 kg (245 lb 9.5 oz)   SpO2 97%     Constitutional:   Body Habitus: Body mass index is 33.31 kg/m².  Cooperation: Fully cooperates with exam  Appearance: Well-groomed no disheveled      Respiratory-  breathing comfortable on room air, no audible wheezing  Cardiovascular- capillary refills less than 2 seconds. No lower extremity edema is noted.   Psychiatric- alert and oriented ×3. Normal affect.    Musculoskeletal/neurological: -       Thoracic/Lumbar Spine/Sacral Spine/Hips     full  active range of motion with flexion, lateral flexion, and rotation bilaterally.   There is full  active range of motion with lumbar extension.      Palpation:   No tenderness to palpation in midline at T1-T12 levels. No tenderness to palpation in the left and right of the midline T1-L5, NEGATIVE for tenderness to palpation to the para-midline region in the lower lumbar levels.  palpation over SI joint: negative bilaterally    palpation in hip or over the gluteus medius tendon insertion: negative bilaterally      Lumbar spine Special tests  Neuro tension  Straight leg test negative right, positive left    Slump test negative right, positive left      Key points for the international standards for neurological classification of spinal cord injury (ISNCSCI) to light touch.     Dermatome R L                                      L2 2 2   L3 2 2   L4 2 2   L5 2 2   S1 2 2   S2 2 2         Motor Exam Lower Extremities    ? Myotome R L   Hip flexion L2 5 5   Knee  extension L3 5 5   Ankle dorsiflexion L4 5 5   Toe extension L5 5 5   Ankle plantarflexion S1 5 5               MEDICAL DECISION MAKING    DATA    Labs:    Lab Results   Component Value Date/Time    SODIUM 135 01/24/2022 12:05 PM    POTASSIUM 4.0 01/24/2022 12:05 PM    CHLORIDE 99 01/24/2022 12:05 PM    CO2 25 01/24/2022 12:05 PM    GLUCOSE 329 (H) 01/24/2022 12:05 PM    BUN 13 01/24/2022 12:05 PM    CREATININE 0.92 01/24/2022 12:05 PM        No results found for: PROTHROMBTM, INR     Lab Results   Component Value Date/Time    WBC 6.9 08/22/2017 11:52 AM    RBC 6.05 08/22/2017 11:52 AM    HEMOGLOBIN 18.1 (H) 08/22/2017 11:52 AM    HEMATOCRIT 51.9 08/22/2017 11:52 AM    MCV 85.8 08/22/2017 11:52 AM    MCH 29.9 08/22/2017 11:52 AM    MCHC 34.9 08/22/2017 11:52 AM    MPV 10.8 08/22/2017 11:52 AM    NEUTSPOLYS 66.60 08/22/2017 11:52 AM    LYMPHOCYTES 22.50 08/22/2017 11:52 AM    MONOCYTES 7.30 08/22/2017 11:52 AM    EOSINOPHILS 2.60 08/22/2017 11:52 AM    BASOPHILS 0.60 08/22/2017 11:52 AM        Lab Results   Component Value Date/Time    HBA1C 12.0 (A) 01/24/2022 11:38 AM          Imaging:   I personally reviewed following images    MRI lumbar spine 10/4/2019  Congenitally short pedicles, congenitally small neuroforamen.  Moderate left L4-5 neuroforaminal stenosis.  Mild to moderate right L4-5 neuroforaminal stenosis.  At least mild neuroforaminal stenosis bilaterally L5-S1 with a small high intensity zone of the L5 disc which may represent an annular tear best seen on series 301 image 6.  Facet arthropathy worst bilaterally L4-5 and L5-S1.    I reviewed the following radiology reports                                        Results for orders placed during the hospital encounter of 10/04/19   MR-LUMBAR SPINE-W/O    Impression 1.  Multilevel multifactorial degenerative changes with relatively prominent facet arthropathy at L4-L5 bilaterally  2.  No areas of high-grade central canal narrowing  3.  Areas of neural  foraminal narrowing as described above                                            Results for orders placed in visit on 19   DX-FOOT-COMPLETE 3+ RIGHT    Impression No acute fracture identified.                                                                     DIAGNOSIS   Visit Diagnoses     ICD-10-CM   1. Lumbar radiculopathy  M54.16   2. Uncontrolled type 2 diabetes mellitus with complication, without long-term current use of insulin (HCC)  E11.8    E11.65         ASSESSMENT and PLAN:     Ross Ryder  1975,      Ross was seen today for follow-up.    Diagnoses and all orders for this visit:    Lumbar radiculopathy    Uncontrolled type 2 diabetes mellitus with complication, without long-term current use of insulin ([facility])  Comments:  improved recently with new regimen from endocrinology.         I have ordered a left L3-4 and L4-5 transforaminal epidural steroid injection. The distribution is mostly involving the L3 and L4 nerves.      The risks benefits and alternatives to this procedure were discussed and the patient wishes to proceed with the procedure. Risks include but are not limited to damage to surrounding structures, infection, bleeding, worsening of pain which can be permanent, weakness which can be permanent. Benefits include pain relief, improved function. Alternatives includes not doing the procedure.      We also discussed the specific risk of steroids with diabetes. The patient's recent better control of his diabetes suggest that things are improving. He has not had a new A1c yet but he is working with endocrinology.      Follow up: after the above procedures.     Thank you for allowing me to participate in the care of this patient. If you have any questions please not hesitate to contact me.          Please note that this dictation was created using voice recognition software. I have made every reasonable attempt to correct obvious errors but there may be errors of grammar and  content that I may have overlooked prior to finalization of this note.      Edmund Neff MD  Interventional Spine and Sports Physiatry  Physical Medicine and Rehabilitation  [facility]

## 2022-02-28 NOTE — PATIENT INSTRUCTIONS
Your procedure will be at the [facility] special procedure suite.    [facility] [place] [address]       PRE-PROCEDURE INSTRUCTIONS  You may take your regular medications except:   · No Anti-inflammatories 5 days prior to your procedure. Anti-inflammatories include medicines such as  ibuprofen (Motrin, Advil), Excedrin, Naproxen (Aleve, Anaprox, Naprelan, Naprosyn), Celecoxib (Celebrex), Diclofenac (Voltaren-XR tab), and Meloxicam (Mobic).   · You can take the remainder of your pain medications as prescribed.   · If you are having a diagnostic procedure such as a medial branch block, do not use her pain meds on the day of the procedure  · No Glucophage or Metformin 24 hours before your procedure. You may resume next day after your procedure.  · Call the physiatry office if you are taking or prescribed anti-biotics within five days of procedure.  · Please ask provider if you are taking any new diabetes medication.  · CONTINUE TAKING BLOOD PRESSURE MEDICATIONS AS PRESCRIBED.  · Pain medications will not be prescribed on the procedure day. Procedural pain medication may be used by your provider   · Call your doctor's office performing the procedure if you have a fever, chills, rash or new illness prior to your procedure    Anticoagulation/antiplatelet medications  No Blood thinning medications such as Coumadin, Xarelto, aspirin or Plavix 5 days prior to procedure unless your doctor said to continue these medications. Call your doctor if a new medication is prescribed in this class.     Restrictions for eating before procedure:   · If you are getting procedural sedation, then do not eat to for 8 hours prior to procedure appointment time. Do not drink fluids for four hours prior to your procedure time.   · If you are not having procedural sedation, then Skip the meal prior to your procedure. If you have a morning procedure then skip breakfast. If you have an afternoon procedure then skip lunch.   · You  may drink clear liquids up to 2 hours prior to your procedure  · You must have a  the day of procedure to accompany you home.      POST PROCEDURE INSTRUCTIONS   · No heavy lifting, strenuous bending or strenuous exercise for 3 days after your procedure.  · No hot tubs, baths, swimming for 3 days after your procedure  · You can remove the bandage the day after the procedure.  · IF YOU RECEIVED A STEROID INJECTION. PLEASE NOTE THAT THERE MAY BE A DELAY FOR THE INJECTION TO START WORKING, THE DELAY MAY BE UP TO TWO WEEKS. IF YOU HAVE DIABETES, PLEASE NOTE THAT YOUR SUGAR LEVELS MAY BE ELEVATED FOR 1-2 DAYS AFTER A STEROID INJECTION.  THE STEROID MAY CAUSE TEMPORARY SYMPTOMS WHICH USUALLY RESOLVE ON THEIR OWN WITHIN 1 TO 2 DAYS INCLUDING FACIAL FLUSHING OR A FEELING OF WARMTH ON THE FACE, TEMPORARY INCREASES IN BLOOD SUGAR, INSOMNIA, INCREASED HUNGER  · IF YOU EXPERIENCE PROLONGED WEAKNESS LONGER THAN ONE DAY, BOWEL OR BLADDER INCONTINENCE THEN PLEASE CALL THE PHYSIATRY OFFICE.  · Your leg may feel heavy, weak and numb for up to 1-2 days. Be very careful walking.   ·  You may resume normal activities 3 days after procedure.

## 2022-02-28 NOTE — PROGRESS NOTES
follow up patient note  Interventional spine and sports physiatry, Physical medicine rehabilitation      Chief complaint:   Chief Complaint   Patient presents with   • Follow-Up     Back pain         HISTORY    Please see new patient note by Dr Neff,  for more details.     HPI  Patient identification: Ross Ryder  1975,    With Diagnoses of Lumbar radiculopathy and Uncontrolled type 2 diabetes mellitus with complication, without long-term current use of insulin (HCC) were pertinent to this visit.     Procedures:  2019 left L4-5 and L5-S1 transforaminal epidural steroid injection 100 for pain relief for about 2 years    The patient had excellent pain relief after the epidural steroid injection done previously. However he has been having worsening of pain. The majority of this pain radiates down the left thigh including the lateral which goes then anterior and to the medial aspect of the left knee. This can radiate down the left leg typically the posterior lateral aspect and sometimes involving the calf. 7-8/10 intensity worse at night. Associated cramping sensation.     The patient notes that over the past several weeks his glucose has been under excellent control with endocrinology. His average glucose is 120-140 at home.    He has been to physical therapy and completed this. He has a home exercise program and has done this including the past three months.     Medications tried include ibuprofen, tylneol, muscle relaxers.          ROS Red Flags :   Fever, Chills, Sweats: Denies  Involuntary Weight Loss: Denies  Bowel/Bladder Incontinence: Denies  Saddle Anesthesia: Denies        PMHx:   Past Medical History:   Diagnosis Date   • Back pain    • Diabetes (HCC)    • IFG (impaired fasting glucose)    • Obesity        PSHx:   History reviewed. No pertinent surgical history.    Family history   Family History   Problem Relation Age of Onset   • Cancer Maternal Aunt         anal   • Diabetes Father     • Other Father         liver transplant , Hep C   • Cancer Father         liver   • Diabetes Paternal Grandfather    • Allergies Mother    • Allergies Sister    • Diabetes Sister    • Diabetes Brother    • Diabetes Paternal Uncle    • Heart Disease Neg Hx    • Hypertension Neg Hx    • Hyperlipidemia Neg Hx    • Psychiatric Illness Neg Hx    • Stroke Neg Hx    • Thyroid Neg Hx          Medications:   No outpatient medications have been marked as taking for the 2/28/22 encounter (Office Visit) with Edmund Neff M.D..        Current Outpatient Medications on File Prior to Visit   Medication Sig Dispense Refill   • Dulaglutide 0.75 MG/0.5ML Solution Pen-injector Inject 0.5 mL under the skin every 7 days. 1.96 mL 12   • metformin (GLUCOPHAGE) 1000 MG tablet Take 1 Tablet by mouth 2 times a day with meals. 180 Tablet 1     No current facility-administered medications on file prior to visit.         Allergies:   Allergies   Allergen Reactions   • Penicillin G        Social Hx:   Social History     Socioeconomic History   • Marital status: Single     Spouse name: Not on file   • Number of children: Not on file   • Years of education: Not on file   • Highest education level: Not on file   Occupational History   • Not on file   Tobacco Use   • Smoking status: Never Smoker   • Smokeless tobacco: Never Used   Vaping Use   • Vaping Use: Never used   Substance and Sexual Activity   • Alcohol use: Yes     Alcohol/week: 0.0 oz     Comment: social   • Drug use: Yes     Types: Marijuana, Oral     Comment: CBD    • Sexual activity: Not on file   Other Topics Concern   •  Service No   • Blood Transfusions No   • Caffeine Concern No   • Occupational Exposure No   • Hobby Hazards No   • Sleep Concern No   • Stress Concern No   • Weight Concern No   • Special Diet No   • Back Care No   • Exercise Yes   • Bike Helmet No   • Seat Belt Yes   • Self-Exams Yes   Social History Narrative   • Not on file     Social Determinants of  Health     Financial Resource Strain: Not on file   Food Insecurity: Not on file   Transportation Needs: Not on file   Physical Activity: Not on file   Stress: Not on file   Social Connections: Not on file   Intimate Partner Violence: Not on file   Housing Stability: Not on file         EXAMINATION     Physical Exam:   Vitals: /70 (BP Location: Right arm, Patient Position: Sitting, BP Cuff Size: Adult)   Pulse 64   Temp 36.1 °C (97 °F) (Temporal)   Ht 1.829 m (6')   Wt 111 kg (245 lb 9.5 oz)   SpO2 97%     Constitutional:   Body Habitus: Body mass index is 33.31 kg/m².  Cooperation: Fully cooperates with exam  Appearance: Well-groomed no disheveled      Respiratory-  breathing comfortable on room air, no audible wheezing  Cardiovascular- capillary refills less than 2 seconds. No lower extremity edema is noted.   Psychiatric- alert and oriented ×3. Normal affect.    Musculoskeletal/neurological: -       Thoracic/Lumbar Spine/Sacral Spine/Hips     full  active range of motion with flexion, lateral flexion, and rotation bilaterally.   There is full  active range of motion with lumbar extension.      Palpation:   No tenderness to palpation in midline at T1-T12 levels. No tenderness to palpation in the left and right of the midline T1-L5, NEGATIVE for tenderness to palpation to the para-midline region in the lower lumbar levels.  palpation over SI joint: negative bilaterally    palpation in hip or over the gluteus medius tendon insertion: negative bilaterally      Lumbar spine Special tests  Neuro tension  Straight leg test negative right, positive left    Slump test negative right, positive left      Key points for the international standards for neurological classification of spinal cord injury (ISNCSCI) to light touch.     Dermatome R L                                      L2 2 2   L3 2 2   L4 2 2   L5 2 2   S1 2 2   S2 2 2         Motor Exam Lower Extremities    ? Myotome R L   Hip flexion L2 5 5   Knee  extension L3 5 5   Ankle dorsiflexion L4 5 5   Toe extension L5 5 5   Ankle plantarflexion S1 5 5               MEDICAL DECISION MAKING    DATA    Labs:    Lab Results   Component Value Date/Time    SODIUM 135 01/24/2022 12:05 PM    POTASSIUM 4.0 01/24/2022 12:05 PM    CHLORIDE 99 01/24/2022 12:05 PM    CO2 25 01/24/2022 12:05 PM    GLUCOSE 329 (H) 01/24/2022 12:05 PM    BUN 13 01/24/2022 12:05 PM    CREATININE 0.92 01/24/2022 12:05 PM        No results found for: PROTHROMBTM, INR     Lab Results   Component Value Date/Time    WBC 6.9 08/22/2017 11:52 AM    RBC 6.05 08/22/2017 11:52 AM    HEMOGLOBIN 18.1 (H) 08/22/2017 11:52 AM    HEMATOCRIT 51.9 08/22/2017 11:52 AM    MCV 85.8 08/22/2017 11:52 AM    MCH 29.9 08/22/2017 11:52 AM    MCHC 34.9 08/22/2017 11:52 AM    MPV 10.8 08/22/2017 11:52 AM    NEUTSPOLYS 66.60 08/22/2017 11:52 AM    LYMPHOCYTES 22.50 08/22/2017 11:52 AM    MONOCYTES 7.30 08/22/2017 11:52 AM    EOSINOPHILS 2.60 08/22/2017 11:52 AM    BASOPHILS 0.60 08/22/2017 11:52 AM        Lab Results   Component Value Date/Time    HBA1C 12.0 (A) 01/24/2022 11:38 AM          Imaging:   I personally reviewed following images    MRI lumbar spine 10/4/2019  Congenitally short pedicles, congenitally small neuroforamen.  Moderate left L4-5 neuroforaminal stenosis.  Mild to moderate right L4-5 neuroforaminal stenosis.  At least mild neuroforaminal stenosis bilaterally L5-S1 with a small high intensity zone of the L5 disc which may represent an annular tear best seen on series 301 image 6.  Facet arthropathy worst bilaterally L4-5 and L5-S1.    I reviewed the following radiology reports                                        Results for orders placed during the hospital encounter of 10/04/19   MR-LUMBAR SPINE-W/O    Impression 1.  Multilevel multifactorial degenerative changes with relatively prominent facet arthropathy at L4-L5 bilaterally  2.  No areas of high-grade central canal narrowing  3.  Areas of neural  foraminal narrowing as described above                                            Results for orders placed in visit on 19   DX-FOOT-COMPLETE 3+ RIGHT    Impression No acute fracture identified.                                                                     DIAGNOSIS   Visit Diagnoses     ICD-10-CM   1. Lumbar radiculopathy  M54.16   2. Uncontrolled type 2 diabetes mellitus with complication, without long-term current use of insulin (HCC)  E11.8    E11.65         ASSESSMENT and PLAN:     Ross Ryder  1975,      Ross was seen today for follow-up.    Diagnoses and all orders for this visit:    Lumbar radiculopathy    Uncontrolled type 2 diabetes mellitus with complication, without long-term current use of insulin ([facility])  Comments:  improved recently with new regimen from endocrinology.         I have ordered a left L3-4 and L4-5 transforaminal epidural steroid injection. The distribution is mostly involving the L3 and L4 nerves.      The risks benefits and alternatives to this procedure were discussed and the patient wishes to proceed with the procedure. Risks include but are not limited to damage to surrounding structures, infection, bleeding, worsening of pain which can be permanent, weakness which can be permanent. Benefits include pain relief, improved function. Alternatives includes not doing the procedure.      We also discussed the specific risk of steroids with diabetes. The patient's recent better control of his diabetes suggest that things are improving. He has not had a new A1c yet but he is working with endocrinology.      Follow up: after the above procedures.     Thank you for allowing me to participate in the care of this patient. If you have any questions please not hesitate to contact me.          Please note that this dictation was created using voice recognition software. I have made every reasonable attempt to correct obvious errors but there may be errors of grammar and  content that I may have overlooked prior to finalization of this note.      Edmund Neff MD  Interventional Spine and Sports Physiatry  Physical Medicine and Rehabilitation  [facility]

## 2022-03-02 ENCOUNTER — HOSPITAL ENCOUNTER (OUTPATIENT)
Facility: REHABILITATION | Age: 47
End: 2022-03-02
Attending: PHYSICAL MEDICINE & REHABILITATION | Admitting: PHYSICAL MEDICINE & REHABILITATION
Payer: COMMERCIAL

## 2022-03-02 ENCOUNTER — APPOINTMENT (OUTPATIENT)
Dept: RADIOLOGY | Facility: REHABILITATION | Age: 47
End: 2022-03-02
Attending: PHYSICAL MEDICINE & REHABILITATION
Payer: COMMERCIAL

## 2022-03-02 VITALS
WEIGHT: 244.49 LBS | HEIGHT: 72 IN | TEMPERATURE: 98.2 F | BODY MASS INDEX: 33.12 KG/M2 | SYSTOLIC BLOOD PRESSURE: 132 MMHG | HEART RATE: 70 BPM | OXYGEN SATURATION: 96 % | RESPIRATION RATE: 16 BRPM | DIASTOLIC BLOOD PRESSURE: 87 MMHG

## 2022-03-02 LAB — GLUCOSE BLD STRIP.AUTO-MCNC: 191 MG/DL (ref 65–99)

## 2022-03-02 PROCEDURE — 64484 NJX AA&/STRD TFRM EPI L/S EA: CPT

## 2022-03-02 PROCEDURE — 700117 HCHG RX CONTRAST REV CODE 255

## 2022-03-02 PROCEDURE — 82962 GLUCOSE BLOOD TEST: CPT

## 2022-03-02 PROCEDURE — 64483 NJX AA&/STRD TFRM EPI L/S 1: CPT

## 2022-03-02 PROCEDURE — 700111 HCHG RX REV CODE 636 W/ 250 OVERRIDE (IP)

## 2022-03-02 RX ORDER — LIDOCAINE HYDROCHLORIDE 10 MG/ML
INJECTION, SOLUTION EPIDURAL; INFILTRATION; INTRACAUDAL; PERINEURAL
Status: COMPLETED
Start: 2022-03-02 | End: 2022-03-02

## 2022-03-02 RX ORDER — DEXAMETHASONE SODIUM PHOSPHATE 10 MG/ML
INJECTION, SOLUTION INTRAMUSCULAR; INTRAVENOUS
Status: COMPLETED
Start: 2022-03-02 | End: 2022-03-02

## 2022-03-02 RX ADMIN — LIDOCAINE HYDROCHLORIDE 10 ML: 10 INJECTION, SOLUTION EPIDURAL; INFILTRATION; INTRACAUDAL; PERINEURAL at 09:41

## 2022-03-02 RX ADMIN — IOHEXOL 5 ML: 240 INJECTION, SOLUTION INTRATHECAL; INTRAVASCULAR; INTRAVENOUS; ORAL at 09:43

## 2022-03-02 RX ADMIN — DEXAMETHASONE SODIUM PHOSPHATE 10 MG: 10 INJECTION, SOLUTION INTRAMUSCULAR; INTRAVENOUS at 09:43

## 2022-03-02 ASSESSMENT — PAIN DESCRIPTION - PAIN TYPE
TYPE: CHRONIC PAIN

## 2022-03-02 NOTE — PROGRESS NOTES
0925Pt admitted to Pre Procedure area.  Consent signed and post op instructions reviewed with pt.  Medical hx and allergies reviewed.  Hand off given to procedural RN prior to procedure.     [id number] Dr. Neff in to see pt.

## 2022-03-02 NOTE — PROGRESS NOTES
0925 Pt admitted to Pre Procedure area.  Consent signed and post op instructions reviewed with pt.  Hx Diabetes type 2.  Blood sugar checked at 0934, results 191.  MD and room RN notified.  Medical hx and allergies reviewed.  Hand off given to procedural RN prior to procedure.     0930 Dr. Neff in to see pt.    0948 Pt received to Post Procedure area, report received from RN.  Vital signs taken.  Pt drinking juice.  Ice pack offered.    0953 Pt seen by Dr. Neff post procedure, orders received for discharge.  Pt waiting in recliner for ride to arrive.  Stable for d/c.   1023 Pt ambulated without difficulty, accompanied to car.  Discharged to designated .

## 2022-03-02 NOTE — OP REPORT
Date of Service: 3/2/2022     Patient: Ross Ryder 47 y.o. male     MRN: [id number]     Physician/s: Edmund Neff MD    Pre-operative Diagnosis: Lumbar radiculopathy    Post-operative Diagnosis: Lumbar radiculopathy    Procedure: left Lumbar Transforaminal Epidural Steroid  at the L3-4 and L4-5 levels.     Description of procedure:    The risks, benefits, and alternatives of the procedure were reviewed and discussed with the patient.  Written informed consent was freely obtained. A pre-procedural time-out was conducted by the physician verifying patient’s identity, procedure to be performed, procedure site and side, and allergy verification. Appropriate equipment was determined to be in place for the procedure.         The patient's vital signs were carefully monitored before, throughout, and after the procedure.     In the fluoroscopy suite the patient was placed in a prone position, a pillow placed underneath their umbilicus. The skin was prepped and draped in the usual sterile fashion.     The fluoroscope was placed over the lumbar spine and adjusted into the proper AP/Oblique view to enter the transforaminal space just adjacent to the pedicle at the levels below. The targets for injection were then marked at the left L3-4. A 27g 1.5 inch needle was placed into the marked site, and 1mL of 1% Lidocaine was injected subcutaneously into the epidermal and dermal layers. The needle was removed intact.  A 22g 5 inch spinal needle was then placed and advanced under fluoroscopic guidance in an oblique view towards the epidural space of the levels noted above. The needle position was confirmed to not be past the 6 o'clock position in the AP view and it was in the neuroforamen in the lateral view.        The fluoroscope was was then adjusted over the lumbar spine and adjusted into the proper AP/Oblique view to enter the transforaminal space adjacent to the pedicle at the LEFT  L4-5. A 27g 1.5 inch needle was placed  into the marked site, and 1mL of 1% Lidocaine was injected subcutaneously into the epidermal and dermal layers. The needle was removed intact.  A 22g 5 inch spinal needle was then placed and advanced under fluoroscopic guidance in an oblique view towards the epidural space of the levels noted above. The needle position was confirmed to not be past the 6 o'clock position in the AP view and it was in the neuroforamen in the lateral view.       Under live fluoroscopic guidance in the AP view, contrast dye was used to highlight the epidural space spread of each level above. Final fluoroscopic images were saved.  Following negative aspiration, 1mL of 1% lidocaine preservative free with 5mg dexamethasone   was then injected at each level above, and the needles were removed intact after restyleted. The patient's back was covered with a 4x4 gauze, the area was cleansed with sterile normal saline, and a dressing was applied. There were no complications noted.     The patient was then evaluated post-procedure, and was hemodynamically stable prior to leaving the post-operative care unit.     Follow-up as scheduled    Edmund Neff MD  Interventional Spine and Pain  Physical Medicine and Rehabilitation  [facility]          CPT codes  Transforaminal epidural injection- lumbar or sacral (first level):  38279  Transforaminal epidural injection- lumbar or sacral (each additional level):  99008

## 2022-03-02 NOTE — INTERVAL H&P NOTE
H&P reviewed. The patient was examined and there are no changes to the H&P     Lungs clear to auscultation bilaterally.  No abdominal tenderness.  Heart regular rate and rhythm.  Vitals reviewed.    Proceed with the originally scheduled procedure.  The risks, benefits and alternatives were discussed.  The patient understands these.    Pre-Op Diagnosis Codes:     * Lumbar radiculopathy [M54.16]  Procedure(s):  LEFT L3-4 and L4-5 transforaminal epidural steroid injection with fluoroscopic guidance    Edmund Neff MD  Physical Medicine and Rehabilitation  Interventional Spine and Sports Physiatry  [facility]

## 2022-03-02 NOTE — PROGRESS NOTES
Preprocedure assessment completed.  Pertinent health information(DM2) communicated to physician and staff prior to time out.  Patient positioned preprocedure by RN, CNA and XRAY.  Foam wedge under ankles for support.

## 2022-03-03 ENCOUNTER — TELEPHONE (OUTPATIENT)
Dept: PHYSICAL MEDICINE AND REHAB | Facility: MEDICAL CENTER | Age: 47
End: 2022-03-03
Payer: COMMERCIAL

## 2022-03-03 NOTE — TELEPHONE ENCOUNTER
Spoke to Pt in regards to his SP that was done with Dr. Neff dated 3/2/22 for his left L3-4 and L4-5   transforaminal epidural steroid injection with fluoroscopic guidance.

## 2022-03-28 ENCOUNTER — OFFICE VISIT (OUTPATIENT)
Dept: PHYSICAL MEDICINE AND REHAB | Facility: MEDICAL CENTER | Age: 47
End: 2022-03-28
Payer: COMMERCIAL

## 2022-03-28 VITALS
OXYGEN SATURATION: 95 % | DIASTOLIC BLOOD PRESSURE: 72 MMHG | WEIGHT: 244.05 LBS | HEIGHT: 72 IN | HEART RATE: 74 BPM | TEMPERATURE: 96.8 F | SYSTOLIC BLOOD PRESSURE: 106 MMHG | BODY MASS INDEX: 33.06 KG/M2

## 2022-03-28 DIAGNOSIS — M54.16 LUMBAR RADICULOPATHY: ICD-10-CM

## 2022-03-28 DIAGNOSIS — M51.37 DDD (DEGENERATIVE DISC DISEASE), LUMBOSACRAL: ICD-10-CM

## 2022-03-28 PROCEDURE — 99214 OFFICE O/P EST MOD 30 MIN: CPT | Performed by: PHYSICAL MEDICINE & REHABILITATION

## 2022-03-28 RX ORDER — CYCLOBENZAPRINE HCL 10 MG
10 TABLET ORAL NIGHTLY PRN
Qty: 30 TABLET | Refills: 5 | Status: SHIPPED | OUTPATIENT
Start: 2022-03-28

## 2022-03-28 RX ORDER — GABAPENTIN 100 MG/1
100-300 CAPSULE ORAL NIGHTLY PRN
Qty: 90 CAPSULE | Refills: 3 | Status: SHIPPED | OUTPATIENT
Start: 2022-03-28

## 2022-03-28 ASSESSMENT — PATIENT HEALTH QUESTIONNAIRE - PHQ9: CLINICAL INTERPRETATION OF PHQ2 SCORE: 0

## 2022-03-28 ASSESSMENT — PAIN SCALES - GENERAL: PAINLEVEL: 5=MODERATE PAIN

## 2022-03-28 NOTE — PROGRESS NOTES
follow up patient note  Interventional spine and sports physiatry, Physical medicine rehabilitation      Chief complaint:   Chief Complaint   Patient presents with   • Follow-Up     Back pain         HISTORY    Please see new patient note by Dr Neff,  for more details.     HPI  Patient identification: Ross Ryder  1975,    With Diagnoses of Lumbar radiculopathy, DDD (degenerative disc disease), lumbosacral, and Uncontrolled type 2 diabetes mellitus with complication, without long-term current use of insulin (HCC) were pertinent to this visit.     Procedures:  2019 left L4-5 and L5-S1 transforaminal epidural steroid injection 100 for pain relief for about 2 years  3/2/2022 left Lumbar Transforaminal Epidural Steroid  at the L3-4 and L4-5 levels 100% improved for the first two days then 50% improved.     There is typically now no pain during the day at night there is intermittent pain at night that can wake him up. This pain is now worse in the lateral aspect of the left hip. Right sided pain has resolved.  Overall there is still improvement in pain.  The patient's pain currently is 2 out of 10 in intensity.    He has been to physical therapy and completed this. He has a home exercise program and has done this including the past three months.     Medications tried include ibuprofen, tylneol, muscle relaxers, flexeril.          ROS Red Flags :   Fever, Chills, Sweats: Denies  Involuntary Weight Loss: Denies  Bowel/Bladder Incontinence: Denies  Saddle Anesthesia: Denies        PMHx:   Past Medical History:   Diagnosis Date   • Back pain    • Diabetes (HCC)    • IFG (impaired fasting glucose)    • Obesity        PSHx:   Past Surgical History:   Procedure Laterality Date   • LUMBAR TRANSFORAMINAL EPIDURAL STEROID INJECTION Left 3/2/2022    Procedure: LEFT L3-4 and L4-5 transforaminal epidural steroid injection with fluoroscopic guidance;  Surgeon: Edmund Neff M.D.;  Location: [facility];  Service: Pain Management       Family history   Family History   Problem Relation Age of Onset   • Cancer Maternal Aunt         anal   • Diabetes Father    • Other Father         liver transplant , Hep C   • Cancer Father         liver   • Diabetes Paternal Grandfather    • Allergies Mother    • Allergies Sister    • Diabetes Sister    • Diabetes Brother    • Diabetes Paternal Uncle    • Heart Disease Neg Hx    • Hypertension Neg Hx    • Hyperlipidemia Neg Hx    • Psychiatric Illness Neg Hx    • Stroke Neg Hx    • Thyroid Neg Hx          Medications:   Outpatient Medications Marked as Taking for the 3/28/22 encounter (Office Visit) with Edmund Neff M.D.   Medication Sig Dispense Refill   • cyclobenzaprine (FLEXERIL) 10 mg Tab Take 1 Tablet by mouth at bedtime as needed for Mild Pain, Moderate Pain or Muscle Spasms. 30 Tablet 5   • gabapentin (NEURONTIN) 100 MG Cap Take 1-3 Capsules by mouth at bedtime as needed (pain). 90 Capsule 3   • SYNJARDY XR 12.5-1000 MG TABLET SR 24 HR 2 times a day.     • TRULICITY 1.5 MG/0.5ML Solution Pen-injector INJECT 1 PEN UNDER THE SKIN ONCE A WEEK          Current Outpatient Medications on File Prior to Visit   Medication Sig Dispense Refill   • SYNJARDY XR 12.5-1000 MG TABLET SR 24 HR 2 times a day.     • TRULICITY 1.5 MG/0.5ML Solution Pen-injector INJECT 1 PEN UNDER THE SKIN ONCE A WEEK       No current facility-administered medications on file prior to visit.         Allergies:   Allergies   Allergen Reactions   • Penicillin G        Social Hx:   Social History     Socioeconomic History   • Marital status: Single     Spouse name: Not on file   • Number of children: Not on file   • Years of education: Not on file   • Highest education level: Not on file   Occupational History   • Not on file   Tobacco Use   • Smoking status: Never Smoker   • Smokeless tobacco: Never Used   Vaping Use   • Vaping Use: Never used   Substance and Sexual Activity   • Alcohol use: Yes      Alcohol/week: 0.0 oz     Comment: social   • Drug use: Yes     Types: Marijuana, Oral     Comment: CBD    • Sexual activity: Not on file   Other Topics Concern   •  Service No   • Blood Transfusions No   • Caffeine Concern No   • Occupational Exposure No   • Hobby Hazards No   • Sleep Concern No   • Stress Concern No   • Weight Concern No   • Special Diet No   • Back Care No   • Exercise Yes   • Bike Helmet No   • Seat Belt Yes   • Self-Exams Yes   Social History Narrative   • Not on file     Social Determinants of Health     Financial Resource Strain: Not on file   Food Insecurity: Not on file   Transportation Needs: Not on file   Physical Activity: Not on file   Stress: Not on file   Social Connections: Not on file   Intimate Partner Violence: Not on file   Housing Stability: Not on file         EXAMINATION     Physical Exam:   Vitals: /72 (BP Location: Right arm, Patient Position: Sitting, BP Cuff Size: Adult)   Pulse 74   Temp 36 °C (96.8 °F) (Temporal)   Ht 1.829 m (6')   Wt 111 kg (244 lb 0.8 oz)   SpO2 95%     Constitutional:   Body Habitus: Body mass index is 33.1 kg/m².  Cooperation: Fully cooperates with exam  Appearance: Well-groomed no disheveled      Respiratory-  breathing comfortable on room air, no audible wheezing  Cardiovascular- No lower extremity edema is noted.   Psychiatric- alert and oriented ×3. Normal affect.    Musculoskeletal/neurological: -       Thoracic/Lumbar Spine/Sacral Spine/Hips   full  active range of motion with flexion, lateral flexion, and rotation bilaterally.   There is full  active range of motion with lumbar extension.    There is no  pain with lumbar extension.   There is no pain with facet loading maneuver (extension rotation) with axial low back pain on the BILATERAL side(s)    Palpation:   No tenderness to palpation in midline at T1-T12 levels. No tenderness to palpation in the left and right of the midline T1-L5, NEGATIVE for tenderness to  palpation to the para-midline region in the lower lumbar levels.  palpation over SI joint: negative bilaterally    palpation in hip or over the gluteus medius tendon insertion: negative bilaterally      Lumbar spine Special tests  Neuro tension  Straight leg test negative bilaterally    Slump test negative bilaterally      Key points for the international standards for neurological classification of spinal cord injury (ISNCSCI) to light touch.     Dermatome R L                                      L2 2 2   L3 2 2   L4 2 2   L5 2 2   S1 2 2   S2 2 2         Motor Exam Lower Extremities    ? Myotome R L   Hip flexion L2 5 5   Knee extension L3 5 5   Ankle dorsiflexion L4 5 5   Toe extension L5 5 5   Ankle plantarflexion S1 5 5                 MEDICAL DECISION MAKING    DATA    Labs:    Lab Results   Component Value Date/Time    SODIUM 135 01/24/2022 12:05 PM    POTASSIUM 4.0 01/24/2022 12:05 PM    CHLORIDE 99 01/24/2022 12:05 PM    CO2 25 01/24/2022 12:05 PM    GLUCOSE 329 (H) 01/24/2022 12:05 PM    BUN 13 01/24/2022 12:05 PM    CREATININE 0.92 01/24/2022 12:05 PM        No results found for: PROTHROMBTM, INR     Lab Results   Component Value Date/Time    WBC 6.9 08/22/2017 11:52 AM    RBC 6.05 08/22/2017 11:52 AM    HEMOGLOBIN 18.1 (H) 08/22/2017 11:52 AM    HEMATOCRIT 51.9 08/22/2017 11:52 AM    MCV 85.8 08/22/2017 11:52 AM    MCH 29.9 08/22/2017 11:52 AM    MCHC 34.9 08/22/2017 11:52 AM    MPV 10.8 08/22/2017 11:52 AM    NEUTSPOLYS 66.60 08/22/2017 11:52 AM    LYMPHOCYTES 22.50 08/22/2017 11:52 AM    MONOCYTES 7.30 08/22/2017 11:52 AM    EOSINOPHILS 2.60 08/22/2017 11:52 AM    BASOPHILS 0.60 08/22/2017 11:52 AM        Lab Results   Component Value Date/Time    HBA1C 12.0 (A) 01/24/2022 11:38 AM          Imaging:   I personally reviewed following images    MRI lumbar spine 10/4/2019  Congenitally short pedicles, congenitally small neuroforamen.  Moderate left L4-5 neuroforaminal stenosis.  Mild to moderate right  L4-5 neuroforaminal stenosis.  At least mild neuroforaminal stenosis bilaterally L5-S1 with a small high intensity zone of the L5 disc which may represent an annular tear best seen on series 301 image 6.  Facet arthropathy worst bilaterally L4-5 and L5-S1.    I reviewed the following radiology reports                                        Results for orders placed during the hospital encounter of 10/04/19   MR-LUMBAR SPINE-W/O    Impression 1.  Multilevel multifactorial degenerative changes with relatively prominent facet arthropathy at L4-L5 bilaterally  2.  No areas of high-grade central canal narrowing  3.  Areas of neural foraminal narrowing as described above                                            Results for orders placed in visit on 19   DX-FOOT-COMPLETE 3+ RIGHT    Impression No acute fracture identified.                                                                     DIAGNOSIS   Visit Diagnoses     ICD-10-CM   1. Lumbar radiculopathy  M54.16   2. DDD (degenerative disc disease), lumbosacral  M51.37   3. Uncontrolled type 2 diabetes mellitus with complication, without long-term current use of insulin (HCC)  E11.8    E11.65         ASSESSMENT and PLAN:     Ross Ryder SHAVONNE 1975,      Ross was seen today for follow-up.    Diagnoses and all orders for this visit:    Lumbar radiculopathy  -     cyclobenzaprine (FLEXERIL) 10 mg Tab; Take 1 Tablet by mouth at bedtime as needed for Mild Pain, Moderate Pain or Muscle Spasms.  -     Referral to Physical Therapy  -     gabapentin (NEURONTIN) 100 MG Cap; Take 1-3 Capsules by mouth at bedtime as needed (pain).    DDD (degenerative disc disease), lumbosacral  -     cyclobenzaprine (FLEXERIL) 10 mg Tab; Take 1 Tablet by mouth at bedtime as needed for Mild Pain, Moderate Pain or Muscle Spasms.  -     Referral to Physical Therapy  -     gabapentin (NEURONTIN) 100 MG Cap; Take 1-3 Capsules by mouth at bedtime as needed (pain).    Uncontrolled type 2  diabetes mellitus with complication, without long-term current use of insulin (HCC)         The patient had improvement after the epidural steroid injection.  He still does have pain and symptoms but functionally he is improved especially during the day.  We discussed his home exercise program and physical therapy.      Follow up: As needed with me after the above conservative treatments      Thank you for allowing me to participate in the care of this patient. If you have any questions please not hesitate to contact me.          Please note that this dictation was created using voice recognition software. I have made every reasonable attempt to correct obvious errors but there may be errors of grammar and content that I may have overlooked prior to finalization of this note.      Edmund Neff MD  Interventional Spine and Sports Physiatry  Physical Medicine and Rehabilitation  [facility]

## 2022-05-02 ENCOUNTER — PHYSICAL THERAPY (OUTPATIENT)
Dept: PHYSICAL THERAPY | Facility: MEDICAL CENTER | Age: 47
End: 2022-05-02
Attending: PHYSICAL MEDICINE & REHABILITATION
Payer: COMMERCIAL

## 2022-05-02 DIAGNOSIS — M54.50 LOW BACK PAIN ASSOCIATED WITH A SPINAL DISORDER OTHER THAN RADICULOPATHY OR SPINAL STENOSIS: ICD-10-CM

## 2022-05-02 PROCEDURE — 97110 THERAPEUTIC EXERCISES: CPT

## 2022-05-02 SDOH — ECONOMIC STABILITY: GENERAL: QUALITY OF LIFE: GOOD

## 2022-05-02 ASSESSMENT — ENCOUNTER SYMPTOMS
PAIN SCALE AT LOWEST: 1
AWAKENINGS PER NIGHT: 3
PAIN SCALE: 1
QUALITY: ACHING
PAIN SCALE AT HIGHEST: 8

## 2022-05-02 NOTE — OP THERAPY EVALUATION
"  Outpatient Physical Therapy  INITIAL EVALUATION    [facility] Outpatient Physical Therapy  [address]  [place] [address]  Phone:  [phone]  Fax:  [phone]    Date of Evaluation: 2022    Patient: Ross Ryder  YOB: 1975  MRN: [id number]     Referring Provider: Edmund Neff M.D.  [address]  [place] [address]   Referring Diagnosis Lumbar radiculopathy [M54.16];DDD (degenerative disc disease), lumbosacral [M51.37]     Time Calculation  Start time: 0915  Stop time: 1000 Time Calculation (min): 45 minutes         Chief Complaint: Back Problem    Visit Diagnoses     ICD-10-CM   1. Low back pain associated with a spinal disorder other than radiculopathy or spinal stenosis  M54.50       Date of onset of impairment: No data found    Subjective:   History of Present Illness:     Mechanism of injury:  Patient reports flare up of L hip/thigh achiness for past 5 months. He received an epidural in 2022 with relief of R low back pain but no improvement with L low back/ buttock/ anterior thigh symptoms. He also reports new symptom of L LE weakness and occasional drop foot. He denies numbness, tingling, inability to control bowel or bladder.     History of bilat knee internal derangement (meniscus tear), chronic bilat ankle sprains   Quality of life:  Good  Sleep disturbance:  Interrupted sleep  # Times/Night awakened:  3  Pain:     Current pain ratin    At best pain ratin    At worst pain ratin    Quality:  Aching    Pain Comments::  Symptoms  1. intermittent L lateral leg aching at night and when walking  2. Intermittent L leg \"not responding\" when walking approx 6x/day with thigh weakness/ foot dragging     Aggravating factors  - sleeping on L side and walking  - heavy household activities (lifting heavy objects, going up/down ladder frequently, working on vehicle)    Easing factors  - flexaril  - stretching (hip " fig 4 stretch)  - gabapentin (less leg weakness since starting this)  - steroid injection  Social Support:     Lives in:  Multiple-level home    Lives with:  Significant other (SO 2 children live with them (11,12 years hold), and cat)  Diagnostic Tests:     MRI studies: abnormal      Diagnostic Tests Comments:  From 10/4/2019    FINDINGS:The lowest formed intervertebral disc will be designated L5-S1 for the purposes of this report and vertebral levels numbered accordingly.        The lumbar vertebral bodies have unremarkable height and no gross malalignment.  No acute or suspicious osseous lesion is seen.  There is mild loss of intervertebral disc height at L1-L2 and L4-L5. There is loss of fluid disc signal at L1-L2, L4-L5 and L5-S1.  There are Modic type II degenerative endplate changes adjacent to the T11-T12, T12-L1 and L1-L2 intervertebral discs.  The conus medullaris has a normal caliber course and signal intensity.     Level specific findings as follows:     L1-2: Mild diffuse disc bulge  L2-3: Unremarkable  L3-4: Mild BILATERAL facet arthropathy  L4-5: Mild diffuse disc bulge. Moderate to severe BILATERAL facet arthropathy. Mild RIGHT and moderate LEFT neural foraminal narrowing.  L5-S1: Small posterior disc protrusion. Mild RIGHT facet arthropathy. Mild RIGHT neural foraminal narrowing.     Soft tissues: No abdominal aortic aneurysm or soft tissue mass is seen.     IMPRESSION:     1.  Multilevel multifactorial degenerative changes with relatively prominent facet arthropathy at L4-L5 bilaterally  2.  No areas of high-grade central canal narrowing  3.  Areas of neural foraminal narrowing as described above  Treatments:     Current treatment:  Steroid injection    Treatment Comments:  3/2/2022 left Lumbar Transforaminal Epidural Steroid  at the L3-4 and L4-5 levels 100% improved for the first two days then 50% improved  Activities of Daily Living:     Patient reported ADL status: - works on cars frequently and  is primary for household tasks d/t SO ongoing recovery from pituitary tumor  Patient Goals:     Patient goals for therapy:  Increased strength and decreased pain      Past Medical History:   Diagnosis Date   • Back pain    • Diabetes (HCC)    • IFG (impaired fasting glucose)    • Obesity      Past Surgical History:   Procedure Laterality Date   • LUMBAR TRANSFORAMINAL EPIDURAL STEROID INJECTION Left 3/2/2022    Procedure: LEFT L3-4 and L4-5 transforaminal epidural steroid injection with fluoroscopic guidance;  Surgeon: Edmund Neff M.D.;  Location: [facility];  Service: Pain Management     Social History     Tobacco Use   • Smoking status: Never Smoker   • Smokeless tobacco: Never Used   Substance Use Topics   • Alcohol use: Yes     Alcohol/week: 0.0 oz     Comment: social     Family and Occupational History     Socioeconomic History   • Marital status: Single     Spouse name: Not on file   • Number of children: Not on file   • Years of education: Not on file   • Highest education level: Not on file   Occupational History   • Not on file       Objective     Postural Observations    Additional Postural Observation Details  R shoulder appears higher, L innominate appears higher/ anteriorly rotated, decreased lumbar lordosis    Hip Screen   Hip range of motion within functional limits with the following exceptions: AROM hip flexion  R = 102 deg  L = 107 deg, * pain with L hip flexion    Neurological Testing     Reflexes   Left   Patellar (L4): normal (2+)  Achilles (S1): trace (1+)    Right   Patellar (L4): normal (2+)  Achilles (S1): normal (2+)    Myotome testing   Lumbar (left)   L1 (hip flexors): 4+  L2 (hip flexors): 4+  L3 (knee extensors): 5  L4 (ankle dorsiflexors): 5  L5 (great toe extension): 5  S1 (ankle plantar flexors): 5    Lumbar (right)   L1 (hip flexors): 5  L2 (hip flexors): 5  L3 (knee extensors): 5  L4 (ankle dorsiflexors): 4+  L5 (great toe extension): 5  S1 (ankle plantar  "flexors): 4+    Palpation   Left   Hypertonic in the piriformis.   Tenderness of the piriformis.     Active Range of Motion     Lumbar   Flexion: decreased  Extension: within functional limits  Left rotation: within functional limits  Right rotation: within functional limits    Additional Active Range of Motion Details  With and without overpressure    Joint Play   Spine     Central PA [place]        T12: WFL       L1: hypermobile       L2: hypomobile       L3: hypomobile       L4: hypomobile       L5: hypomobile        Tests       Lumbar spine (left)      Negative slump.   Lumbar spine (right)     Negative slump.     Additional Tests Details  NAKITA neg bilat  Howard + bilat for IT band/ quad tightness ( L > R)  FADIR + on L  SLR + for bilat hamstring tightness    General Comments     Spine Comments   Assess SL balance next visit         Therapeutic Exercises (CPT 54591):     1. Hooklying fig 4 stretch , 1 min, hep    2. Hooklying hip flex + IR stretch , 1 min , hep      Time-based treatments/modalities:    Physical Therapy Timed Treatment Charges  Therapeutic exercise minutes (CPT 50557): 10 minutes      Assessment, Response and Plan:   Impairments: abnormal or restricted ROM, impaired physical strength, limited mobility and pain with function    Assessment details:  Ross is a 47 year old male who presents to physical therapy with episode of low back/ hip pain with L LE radicular symptoms/ weakness. He demonstrates L LE strength and flexibility impairments as well as positive results from hip ROM/ special tests. He would benefit from skilled therapy to address impairments and restore function.   Barriers to therapy:  None  Prognosis: good    Goals:   Short Term Goals:   Patient will be able to sleep with < 1 occurance of sleep disrupted d/t L LE symptoms.  Short term goal time span:  2-4 weeks      Long Term Goals:    Patient will be able to work 40 hours without instance of balance being disrupted d/t L LE \"not " "responding\"   Long term goal time span:  6-8 weeks    Plan:   Planned therapy interventions:  Neuromuscular Re-education (CPT 87966), Manual Therapy (CPT 78195), Mechanical Traction (CPT 86051), Therapeutic Exercise (CPT 19833), Therapeutic Activities (CPT 95848) and E Stim Unattended (CPT 31124)  Frequency:  2x week  Duration in weeks:  8  Duration in visits:  16  Discussed with:  Patient      Functional Assessment Used        Referring provider co-signature:  I have reviewed this plan of care and my co-signature certifies the need for services.    Certification Period: 05/02/2022 to  07/31/22    Physician Signature: ________________________________ Date: ______________               "

## 2022-05-09 ENCOUNTER — PHYSICAL THERAPY (OUTPATIENT)
Dept: PHYSICAL THERAPY | Facility: MEDICAL CENTER | Age: 47
End: 2022-05-09
Attending: PHYSICAL MEDICINE & REHABILITATION
Payer: COMMERCIAL

## 2022-05-09 DIAGNOSIS — M54.50 LOW BACK PAIN ASSOCIATED WITH A SPINAL DISORDER OTHER THAN RADICULOPATHY OR SPINAL STENOSIS: ICD-10-CM

## 2022-05-09 PROCEDURE — 97110 THERAPEUTIC EXERCISES: CPT

## 2022-05-09 PROCEDURE — 97140 MANUAL THERAPY 1/> REGIONS: CPT

## 2022-05-09 NOTE — OP THERAPY DAILY TREATMENT
Outpatient Physical Therapy  DAILY TREATMENT     [facility]  [address]  [place] [address]  Phone:  [phone]  Fax:  [phone]    Date: 05/09/2022    Patient: Ross Ryder  YOB: 1975  MRN: [id number]     Time Calculation    Start time: 0918  Stop time: 1000 Time Calculation (min): 42 minutes         Chief Complaint: Back Problem and Hip Problem    Visit #: 2    SUBJECTIVE: Patient reports symptoms are intermittent. He had a bout of L lateral hip pain with descending stairs the other day.     OBJECTIVE:  Current objective measures:     SL balance  R = 30 sec, mild ankle instability  L = 30 sec, mild ipsilateral trunk lean    Hip ROM:  External rotation  R = 37 deg  L = 30 deg  Internal rotation   R = 27 deg  L = 15 deg         TTP L lateral quad/ distal psoas      Therapeutic Exercises (CPT 22167):     1. Hooklying fig 4 stretch , 1 min, hep    2. Hooklying hip flex + IR stretch , 1 min , hep    3. Supine hamstring stretch , 30 sec l side , hep    4. Prone quad stretch , 30 sec L side    5. Child's pose stretch , 1 min     6. Kneeling hip flexor stretch , 2 x 30 sec (L only)    7. L SL squat , 3 reps      Time-based treatments/modalities:    Physical Therapy Timed Treatment Charges  Manual therapy minutes (CPT 76132): 15 minutes  Therapeutic exercise minutes (CPT 11570): 27 minutes        ASSESSMENT:   Response to treatment: His L hip mobility is limited and he has TTP to L lateral quad and psoas. Symptoms decreased with manual. Progressed LE stretching. He also reports L hip weakness with SL squats. Progressed hip strengthening next visit.   PLAN/RECOMMENDATIONS:   Plan for treatment: therapy treatment to continue next visit.  Planned interventions for next visit: continue with current treatment.

## 2022-05-16 ENCOUNTER — PHYSICAL THERAPY (OUTPATIENT)
Dept: PHYSICAL THERAPY | Facility: MEDICAL CENTER | Age: 47
End: 2022-05-16
Attending: PHYSICAL MEDICINE & REHABILITATION
Payer: COMMERCIAL

## 2022-05-16 DIAGNOSIS — M54.50 LOW BACK PAIN ASSOCIATED WITH A SPINAL DISORDER OTHER THAN RADICULOPATHY OR SPINAL STENOSIS: ICD-10-CM

## 2022-05-16 PROCEDURE — 97110 THERAPEUTIC EXERCISES: CPT

## 2022-05-16 PROCEDURE — 97012 MECHANICAL TRACTION THERAPY: CPT

## 2022-05-16 PROCEDURE — 97140 MANUAL THERAPY 1/> REGIONS: CPT

## 2022-05-16 NOTE — OP THERAPY DAILY TREATMENT
"  Outpatient Physical Therapy  DAILY TREATMENT     [facility]  [address]  [place] [address]  Phone:  [phone]  Fax:  [phone]    Date: 05/16/2022    Patient: Ross Ryder  YOB: 1975  MRN: [id number]     Time Calculation    Start time: 1120  Stop time: 1225 Time Calculation (min): 65 minutes         Chief Complaint: Hip Problem    Visit #: 3    SUBJECTIVE: Ross reports he had L hip/ thigh pain when he was carrying ~ 40 lbs going up stairs at home. He reports lateral thigh soreness lasted for a couple hours afterward    OBJECTIVE:  Current objective measures: na          Therapeutic Exercises (CPT 97957):     1. Hooklying fig 4 stretch , 1 min, hep    2. Hooklying hip flex + IR stretch , 1 min , hep    3. Supine hamstring stretch , 30 sec l side , hep    4. Standing quad stretch , 3 x 30 sec , hep    5. LTR , 20x    6. Hip flexor stretch off table , 1 min (L)    7. L LE step up 8 in , 8x, 10x , dec L posterior hip pain post manual     8. Cat camel , 10x    9. Bridge w/ ball , 2 x 15 , tactile cues initially to enhance pelvic control     10. Bird dog over ball , 2 x 20, tactile cues to enhance pelvic control     11. Shuttle leg press , 4 x 10 6c      Therapeutic Exercise Summary: Added bridges, cat/camel and LTR to HEP    Therapeutic Treatments and Modalities:     1. Manual Therapy (CPT 34066), see below    2. Mechanical Traction (CPT 51646), 110/80 lbs, 10\"/10\", 10 min w/ MHP to low back    Therapeutic Treatment and Modalities Summary: Manual  Central Pas L1-L5 segments // with pillow under hips    Time-based treatments/modalities:    Physical Therapy Timed Treatment Charges  Manual therapy minutes (CPT 15085): 10 minutes  Therapeutic exercise minutes (CPT 78606): 35 minutes      ASSESSMENT:   Response to treatment: L hip/thigh symptoms arise when patient goes into hip extension. Worked on lumbar spine this visit with " relief of symptoms with step ups. Continue to focus on lumbar spine for next several visits. Will reassess response to traction next visit.     PLAN/RECOMMENDATIONS:   Plan for treatment: therapy treatment to continue next visit.  Planned interventions for next visit: continue with current treatment.

## 2022-05-17 ENCOUNTER — APPOINTMENT (OUTPATIENT)
Dept: PHYSICAL THERAPY | Facility: MEDICAL CENTER | Age: 47
End: 2022-05-17
Attending: PHYSICAL MEDICINE & REHABILITATION
Payer: COMMERCIAL

## 2022-05-23 ENCOUNTER — PHYSICAL THERAPY (OUTPATIENT)
Dept: PHYSICAL THERAPY | Facility: MEDICAL CENTER | Age: 47
End: 2022-05-23
Attending: PHYSICAL MEDICINE & REHABILITATION
Payer: COMMERCIAL

## 2022-05-23 DIAGNOSIS — M54.50 LOW BACK PAIN ASSOCIATED WITH A SPINAL DISORDER OTHER THAN RADICULOPATHY OR SPINAL STENOSIS: ICD-10-CM

## 2022-05-23 PROCEDURE — 97110 THERAPEUTIC EXERCISES: CPT

## 2022-05-23 PROCEDURE — 97012 MECHANICAL TRACTION THERAPY: CPT

## 2022-05-23 PROCEDURE — 97140 MANUAL THERAPY 1/> REGIONS: CPT

## 2022-05-23 NOTE — OP THERAPY DAILY TREATMENT
"  Outpatient Physical Therapy  DAILY TREATMENT     [facility]  [address]  [place] [address]  Phone:  [phone]  Fax:  [phone]    Date: 05/23/2022    Patient: Ross Ryder  YOB: 1975  MRN: [id number]     Time Calculation    Start time: 0920  Stop time: 1015 Time Calculation (min): 55 minutes         Chief Complaint: Back Problem    Visit #: 4    SUBJECTIVE:  Patient reports he is not having pain in L hip joint, getting mild lateral thigh pain with steps.     OBJECTIVE:  Current objective measures: na          Therapeutic Exercises (CPT 76902):     4. Open book, 15x ea    5. LTR , 20x    6. Hip flexor stretch off table , 1 min (L)    7. L LE step up 8 in , 10x , 2-3/10 L lateral leg pain    8. Cat camel , 20x    9. Bridge w/ ball , 3 x 10     10. Alt LE extension over ball, 2 x 20, verbal cues to enhance pelvic control     11. Shuttle side SL squat , next viist    12. Step down 8 in, 10x, L lateral thigh pain     13. Lat step down, 15x, 10x       Therapeutic Exercise Summary: HEP - bridges, cat/camel and LTR to HEP, fig 4 stretch, quad stretch, hamstring stretch, piriformis stretch (hip flex + IR) 1x/daily    Therapeutic Treatments and Modalities:     1. Manual Therapy (CPT 52988), see below    2. Mechanical Traction (CPT 78753), 110/80 lbs, 10\"/10\", 10 min w/ MHP to low back    Therapeutic Treatment and Modalities Summary: Manual  Central Pas L3-L5 segments // with pillow under hips  unilat PAS L3-L4 segment (bilat) 20x     Time-based treatments/modalities:    Physical Therapy Timed Treatment Charges  Manual therapy minutes (CPT 31857): 8 minutes  Therapeutic exercise minutes (CPT 08858): 30 minutes      ASSESSMENT:   Response to treatment: Mild L lateral thigh pain with loaded exercises in sagittal plane (step up/ down). Mild impaired lateral hip control. Progress hip abductor strengthening and add SL balance. Also assess " IT band.     PLAN/RECOMMENDATIONS:   Plan for treatment: therapy treatment to continue next visit.  Planned interventions for next visit: continue with current treatment.

## 2022-05-24 ENCOUNTER — APPOINTMENT (OUTPATIENT)
Dept: PHYSICAL THERAPY | Facility: MEDICAL CENTER | Age: 47
End: 2022-05-24
Attending: PHYSICAL MEDICINE & REHABILITATION
Payer: COMMERCIAL

## 2022-06-06 ENCOUNTER — PHYSICAL THERAPY (OUTPATIENT)
Dept: PHYSICAL THERAPY | Facility: MEDICAL CENTER | Age: 47
End: 2022-06-06
Attending: PHYSICAL MEDICINE & REHABILITATION
Payer: COMMERCIAL

## 2022-06-06 DIAGNOSIS — M54.50 LOW BACK PAIN ASSOCIATED WITH A SPINAL DISORDER OTHER THAN RADICULOPATHY OR SPINAL STENOSIS: ICD-10-CM

## 2022-06-06 PROCEDURE — 97110 THERAPEUTIC EXERCISES: CPT

## 2022-06-06 PROCEDURE — 97140 MANUAL THERAPY 1/> REGIONS: CPT

## 2022-06-06 NOTE — OP THERAPY DAILY TREATMENT
Outpatient Physical Therapy  DAILY TREATMENT     [facility]  [address]  [place] [address]  Phone:  [phone]  Fax:  [phone]    Date: 06/06/2022    Patient: Ross Ryder  YOB: 1975  MRN: [id number]     Time Calculation    Start time: 1045  Stop time: 1125 Time Calculation (min): 40 minutes         Chief Complaint: Hip Problem and Back Problem    Visit #: 5    SUBJECTIVE:  Less L lateral thigh pain/ LE giving way with steps. He reports intermittent L lateral lower leg discomfort this past week.     OBJECTIVE:  Current objective measures:     - TTP L IT band/ inc tone L lateral quad          Therapeutic Exercises (CPT 59919): , 20x    9. SL bridge , 3 x 8 reps    10. L IT band/ quad self mob w/ FR, 2 min     11. Shuttle side SL squat , 3 x 10     12. Step down 8 in, 10x, L lateral thigh pain     13. Lat step down, 15x, 10x -NT    14. Side step w/ band over feet, 3 laps (blue)    15. TRX 90 deg DL squats, 2 x 10       Therapeutic Exercise Summary: HEP - SL bridges, cat/camel and LTR to HEP, fig 4 stretch, quad stretch, hamstring stretch, piriformis stretch (hip flex + IR) 1x/daily, side step w/ band over feet (blue)    Therapeutic Treatments and Modalities:     1. Manual Therapy (CPT 87267), see below    Therapeutic Treatment and Modalities Summary: Manual  - STW L IT band/ quad    Time-based treatments/modalities:    Physical Therapy Timed Treatment Charges  Manual therapy minutes (CPT 74437): 10 minutes  Therapeutic exercise minutes (CPT 69382): 30 minutes  ASSESSMENT:   Response to treatment:  Decreased tone and TTP L IT band with manual and self mobilization. L hip abductor/ extensor weakness present, added new exercises to address this. Still some discomfort with step downs. Continued therapy recommended to address impaired L hip strength/ mobility.     PLAN/RECOMMENDATIONS:   Plan for treatment: therapy treatment to  continue next visit.  Planned interventions for next visit: continue with current treatment.

## 2022-06-13 ENCOUNTER — APPOINTMENT (OUTPATIENT)
Dept: PHYSICAL THERAPY | Facility: MEDICAL CENTER | Age: 47
End: 2022-06-13
Attending: PHYSICAL MEDICINE & REHABILITATION
Payer: COMMERCIAL

## 2022-06-20 ENCOUNTER — PHYSICAL THERAPY (OUTPATIENT)
Dept: PHYSICAL THERAPY | Facility: MEDICAL CENTER | Age: 47
End: 2022-06-20
Attending: PHYSICAL MEDICINE & REHABILITATION
Payer: COMMERCIAL

## 2022-06-20 DIAGNOSIS — M54.50 LOW BACK PAIN ASSOCIATED WITH A SPINAL DISORDER OTHER THAN RADICULOPATHY OR SPINAL STENOSIS: ICD-10-CM

## 2022-06-20 PROCEDURE — 97140 MANUAL THERAPY 1/> REGIONS: CPT

## 2022-06-20 PROCEDURE — 97110 THERAPEUTIC EXERCISES: CPT

## 2022-06-20 NOTE — OP THERAPY DAILY TREATMENT
Outpatient Physical Therapy  DAILY TREATMENT     [facility]  [address]  [place] [address]  Phone:  [phone]  Fax:  [phone]    Date: 06/20/2022    Patient: Ross Ryder  YOB: 1975  MRN: [id number]     Time Calculation    Start time: 1045  Stop time: 1130 Time Calculation (min): 45 minutes         Chief Complaint: Hip Problem    Visit #: 6    SUBJECTIVE:  Patient reports L LE is doing better, L LE giving way happening a couple times a week versus 5-6x/day. He is also able to sleep without L hip pain.     OBJECTIVE:  Current objective measures: na          Therapeutic Exercises (CPT 41075):     7. Hip flexor stretch off end of table, 1 min, L only     8. Standing quad stretch w/ strap , 1 min, L only, corrected knee alignment to enhance quad stretch     9. SL bridge , 2 x10 B    10. Inclined gastroc stretch , 2 min     12. Step down 8 in, 2 x 10 , L lateral thigh pain     13. Lat step over, 2 x 2 min     14. Side step w/ band over feet, 3 laps (black SC)    15. TRX back lunge, 2 x 10       Therapeutic Exercise Summary: HEP - SL bridges, cat/camel and LTR to HEP, fig 4 stretch, quad stretch, hamstring stretch, piriformis stretch (hip flex + IR) 1x/daily, side step w/ band over feet (blue), gastroc stretch off step    Therapeutic Treatments and Modalities:     1. Manual Therapy (CPT 48209), see below    Therapeutic Treatment and Modalities Summary: Manual  - STW L IT band/ quad    Time-based treatments/modalities:    Physical Therapy Timed Treatment Charges  Manual therapy minutes (CPT 63798): 10 minutes  Therapeutic exercise minutes (CPT 47116): 35 minutes      ASSESSMENT:   Response to treatment: Increased eccentric control with L step down. He has tendency to increase L knee flexion due to decreased L ankle dorsiflexion. Added gastroc stretch to address this. Moderate fatigue bilat with SL bridge. Overall he is doing  well with L LE strength,  Flexibility, and symptoms with function.      PLAN/RECOMMENDATIONS:   Plan for treatment: therapy treatment to continue next visit.  Planned interventions for next visit: continue with current treatment.

## 2022-06-27 ENCOUNTER — APPOINTMENT (OUTPATIENT)
Dept: PHYSICAL THERAPY | Facility: MEDICAL CENTER | Age: 47
End: 2022-06-27
Attending: PHYSICAL MEDICINE & REHABILITATION
Payer: COMMERCIAL

## 2022-06-27 NOTE — OP THERAPY DAILY TREATMENT
Outpatient Physical Therapy  DAILY TREATMENT     [facility]  [address]  [place] [address]  Phone:  [phone]  Fax:  [phone]    Date: 06/27/2022    Patient: Ross Ryder  YOB: 1975  MRN: [id number]     Time Calculation                   Chief Complaint: No chief complaint on file.    Visit #: 7    SUBJECTIVE:  Patient reports L LE is doing better, L LE giving way happening a couple times a week versus 5-6x/day. He is also able to sleep without L hip pain.     OBJECTIVE:  Current objective measures: na          Therapeutic Exercises (CPT 73973):     7. Hip flexor stretch off end of table, 1 min, L only     8. Standing quad stretch w/ strap , 1 min, L only, corrected knee alignment to enhance quad stretch     9. SL bridge , 2 x10 B    10. Inclined gastroc stretch , 2 min     12. Step down 8 in, 2 x 10 , L lateral thigh pain     13. Lat step over, 2 x 2 min     14. Side step w/ band over feet, 3 laps (black SC)    15. TRX back lunge, 2 x 10       Therapeutic Exercise Summary: HEP - SL bridges, cat/camel and LTR to HEP, fig 4 stretch, quad stretch, hamstring stretch, piriformis stretch (hip flex + IR) 1x/daily, side step w/ band over feet (blue), gastroc stretch off step    Therapeutic Treatments and Modalities:     1. Manual Therapy (CPT 86063), see below    Therapeutic Treatment and Modalities Summary: Manual  - STW L IT band/ quad    Time-based treatments/modalities:           ASSESSMENT:   Response to treatment: Increased eccentric control with L step down. He has tendency to increase L knee flexion due to decreased L ankle dorsiflexion. Added gastroc stretch to address this. Moderate fatigue bilat with SL bridge. Overall he is doing well with L LE strength,  Flexibility, and symptoms with function.      PLAN/RECOMMENDATIONS:   Plan for treatment: therapy treatment to continue next visit.  Planned interventions for next  visit: continue with current treatment.

## 2022-06-29 ENCOUNTER — APPOINTMENT (OUTPATIENT)
Dept: PHYSICAL THERAPY | Facility: MEDICAL CENTER | Age: 47
End: 2022-06-29
Attending: PHYSICAL MEDICINE & REHABILITATION
Payer: COMMERCIAL

## 2024-06-27 DIAGNOSIS — E11.51 TYPE 2 DIABETES WITH PERIPHERAL CIRCULATORY DISORDER, CONTROLLED (HCC): ICD-10-CM
